# Patient Record
Sex: FEMALE | Race: WHITE | Employment: UNEMPLOYED | URBAN - NONMETROPOLITAN AREA
[De-identification: names, ages, dates, MRNs, and addresses within clinical notes are randomized per-mention and may not be internally consistent; named-entity substitution may affect disease eponyms.]

---

## 2017-01-16 PROBLEM — O09.529 ADVANCED MATERNAL AGE IN MULTIGRAVIDA: Status: ACTIVE | Noted: 2017-01-16

## 2017-12-14 ENCOUNTER — HOSPITAL ENCOUNTER (OUTPATIENT)
Dept: MRI IMAGING | Age: 37
Discharge: HOME | End: 2017-12-14
Payer: COMMERCIAL

## 2017-12-14 DIAGNOSIS — S93.402D SPRAIN OF LEFT ANKLE, UNSPECIFIED LIGAMENT, SUBSEQUENT ENCOUNTER: ICD-10-CM

## 2017-12-14 PROCEDURE — 73721 MRI JNT OF LWR EXTRE W/O DYE: CPT

## 2019-08-29 ENCOUNTER — NURSE ONLY (OUTPATIENT)
Dept: LAB | Age: 39
End: 2019-08-29

## 2019-08-29 ENCOUNTER — OFFICE VISIT (OUTPATIENT)
Dept: RHEUMATOLOGY | Age: 39
End: 2019-08-29
Payer: COMMERCIAL

## 2019-08-29 VITALS
DIASTOLIC BLOOD PRESSURE: 70 MMHG | BODY MASS INDEX: 24.77 KG/M2 | HEART RATE: 81 BPM | SYSTOLIC BLOOD PRESSURE: 110 MMHG | WEIGHT: 134.6 LBS | HEIGHT: 62 IN | OXYGEN SATURATION: 95 %

## 2019-08-29 DIAGNOSIS — Z51.81 MEDICATION MONITORING ENCOUNTER: ICD-10-CM

## 2019-08-29 DIAGNOSIS — M25.511 CHRONIC PAIN OF BOTH SHOULDERS: ICD-10-CM

## 2019-08-29 DIAGNOSIS — G89.29 CHRONIC PAIN OF BOTH SHOULDERS: ICD-10-CM

## 2019-08-29 DIAGNOSIS — M35.9 UNDIFFERENTIATED CONNECTIVE TISSUE DISEASE (HCC): Primary | ICD-10-CM

## 2019-08-29 DIAGNOSIS — M35.9 UNDIFFERENTIATED CONNECTIVE TISSUE DISEASE (HCC): ICD-10-CM

## 2019-08-29 DIAGNOSIS — M25.512 CHRONIC PAIN OF BOTH SHOULDERS: ICD-10-CM

## 2019-08-29 LAB
ALBUMIN SERPL-MCNC: 4.7 G/DL (ref 3.5–5.1)
ALP BLD-CCNC: 43 U/L (ref 38–126)
ALT SERPL-CCNC: 12 U/L (ref 11–66)
ANION GAP SERPL CALCULATED.3IONS-SCNC: 11 MEQ/L (ref 8–16)
AST SERPL-CCNC: 15 U/L (ref 5–40)
BASOPHILS # BLD: 1 %
BASOPHILS ABSOLUTE: 0.1 THOU/MM3 (ref 0–0.1)
BILIRUB SERPL-MCNC: 0.3 MG/DL (ref 0.3–1.2)
BUN BLDV-MCNC: 18 MG/DL (ref 7–22)
C-REACTIVE PROTEIN: 0.07 MG/DL (ref 0–1)
CALCIUM SERPL-MCNC: 9.6 MG/DL (ref 8.5–10.5)
CHLORIDE BLD-SCNC: 103 MEQ/L (ref 98–111)
CO2: 28 MEQ/L (ref 23–33)
CREAT SERPL-MCNC: 0.5 MG/DL (ref 0.4–1.2)
EOSINOPHIL # BLD: 1.5 %
EOSINOPHILS ABSOLUTE: 0.1 THOU/MM3 (ref 0–0.4)
ERYTHROCYTE [DISTWIDTH] IN BLOOD BY AUTOMATED COUNT: 11 % (ref 11.5–14.5)
ERYTHROCYTE [DISTWIDTH] IN BLOOD BY AUTOMATED COUNT: 41.9 FL (ref 35–45)
GFR SERPL CREATININE-BSD FRML MDRD: > 90 ML/MIN/1.73M2
GLUCOSE BLD-MCNC: 95 MG/DL (ref 70–108)
HCT VFR BLD CALC: 45.1 % (ref 37–47)
HEMOGLOBIN: 14.8 GM/DL (ref 12–16)
IMMATURE GRANS (ABS): 0.02 THOU/MM3 (ref 0–0.07)
IMMATURE GRANULOCYTES: 0 %
LYMPHOCYTES # BLD: 28.7 %
LYMPHOCYTES ABSOLUTE: 1.7 THOU/MM3 (ref 1–4.8)
MCH RBC QN AUTO: 33.6 PG (ref 26–33)
MCHC RBC AUTO-ENTMCNC: 32.8 GM/DL (ref 32.2–35.5)
MCV RBC AUTO: 102.5 FL (ref 81–99)
MONOCYTES # BLD: 6.6 %
MONOCYTES ABSOLUTE: 0.4 THOU/MM3 (ref 0.4–1.3)
NUCLEATED RED BLOOD CELLS: 0 /100 WBC
PLATELET # BLD: 170 THOU/MM3 (ref 130–400)
PMV BLD AUTO: 11.5 FL (ref 9.4–12.4)
POTASSIUM SERPL-SCNC: 4.2 MEQ/L (ref 3.5–5.2)
RBC # BLD: 4.4 MILL/MM3 (ref 4.2–5.4)
SEDIMENTATION RATE, ERYTHROCYTE: 2 MM/HR (ref 0–20)
SEG NEUTROPHILS: 61.9 %
SEGMENTED NEUTROPHILS ABSOLUTE COUNT: 3.7 THOU/MM3 (ref 1.8–7.7)
SODIUM BLD-SCNC: 142 MEQ/L (ref 135–145)
TOTAL PROTEIN: 7 G/DL (ref 6.1–8)
WBC # BLD: 5.9 THOU/MM3 (ref 4.8–10.8)

## 2019-08-29 PROCEDURE — 99244 OFF/OP CNSLTJ NEW/EST MOD 40: CPT | Performed by: INTERNAL MEDICINE

## 2019-08-29 ASSESSMENT — ENCOUNTER SYMPTOMS
DIARRHEA: 0
NAUSEA: 0
EYE PAIN: 0
EYES NEGATIVE: 1
VOMITING: 0
SHORTNESS OF BREATH: 0
EYE REDNESS: 0
COUGH: 0
WHEEZING: 0
CONSTIPATION: 0

## 2019-08-29 NOTE — PROGRESS NOTES
Gastrointestinal: Negative for constipation, diarrhea, nausea and vomiting. Genitourinary: Negative for difficulty urinating, frequency and hematuria. Musculoskeletal: Negative for myalgias. Skin: Negative for rash. Neurological: Negative for dizziness, weakness and headaches. Hematological: Does not bruise/bleed easily. Psychiatric/Behavioral: Negative for sleep disturbance. The patient is not nervous/anxious. PAST MEDICAL HISTORY      Past Medical History:   Diagnosis Date    Asthma     when in high school currently takes no meds, exercised induced    Psoriatic arthritis ([facility] [address]        PAST SURGICAL HISTORY      Past Surgical History:   Procedure Laterality Date    KNEE ARTHROSCOPY      TONSILLECTOMY      WISDOM TOOTH EXTRACTION         FAMILY HISTORY      Family History   Problem Relation Age of Onset    High Blood Pressure Mother     High Blood Pressure Father     Asthma Father        SOCIAL HISTORY      Social History     Tobacco History     Smoking Status  Never Smoker    Smokeless Tobacco Use  Unknown          Alcohol History     Alcohol Use Status  No          Drug Use     Drug Use Status  No          Sexual Activity     Sexually Active  Yes Partners  Male                ALLERGIES     Allergies   Allergen Reactions    Sulfa Antibiotics Rash       CURRENT MEDICATIONS      Current Outpatient Medications   Medication Sig Dispense Refill    hydroxychloroquine (PLAQUENIL) 200 MG tablet Take 200 mg by mouth daily.  aspirin 81 MG chewable tablet Take 81 mg by mouth daily.  prenatal vitamin (PRENATAL-S) 27-0.8 MG TABS Take 1 tablet by mouth daily.  Cholecalciferol (VITAMIN D) 2000 UNITS CAPS capsule Take 1 capsule by mouth. No current facility-administered medications for this visit.         PHYSICAL EXAMINATION / OBJECTIVE     Objective:  /70 (Site: Left Upper Arm, Position: Sitting, Cuff Size: Medium Adult)   Pulse 81   Ht 5' 2.01\" (1.575 m)   Wt 134 components found for: HGBA1C    Lab Results   Component Value Date    TSH 0.642 06/23/2016     No results found for: VITD25      No results found for: ANASCRN  No results found for: SSA  No results found for: SSB  No results found for: ANTI-SMITH  No results found for: DSDNAAB   No results found for: ANTIRNP  No results found for: C3, C4  No results found for: CCPAB  No results found for: RF    No components found for: CANCASCRN, APANCASCRN  No results found for: SEDRATE  No results found for: CRP    RADIOLOGY:     XR hands   XR SI joint May 2012  Impression: Lumbosacral spine and sacroiliac joints. The sacroiliac   joints show no evidence of a sacroiliitis. The alignment of the   lumbosacral spine is within normal limits, no spondylolisthesis, no   wedge compression fractures of the lumbar spine. Both hands: No definite erosions. Preservation of joint space. Mineralization within normal limits. No definite features to suggest an   inflammatory arthropathy. ASSESSMENT/PLAN    Assessment   Plan   1. Undifferentiated connective tissue disease (HCC)   Cont plaquenil 200mg daily    Baseline evaluation as above. - CBC Auto Differential; Future  - Comprehensive Metabolic Panel; Future  - Sedimentation Rate; Future  - C-Reactive Protein; Future  - XR SACROILIAC JOINTS (MIN 3 VIEWS); Future  - XR LUMBAR SPINE (2-3 VIEWS); Future  - Miscellaneous Sendout 1; Future- AVISE CTD panel     2. Medication monitoring encounter  - CBC Auto Differential; Future  - Comprehensive Metabolic Panel; Future  - Sedimentation Rate; Future  - C-Reactive Protein; Future  - XR SACROILIAC JOINTS (MIN 3 VIEWS); Future  - XR LUMBAR SPINE (2-3 VIEWS); Future  - Miscellaneous Sendout 1; Future    3. Chronic pain of both shoulders  Crepitus, localized tenderness lateral shoulder, + crank, hawking, scarf testing.    - ? Labral vs rotator cuff pathology   - x-ray eval.    - discussed divina cuellar.    - CBC Auto Differential; Future  -

## 2019-08-30 ENCOUNTER — TELEPHONE (OUTPATIENT)
Dept: RHEUMATOLOGY | Age: 39
End: 2019-08-30

## 2019-08-31 LAB
DRVVT 1:1 MIX: NORMAL SEC (ref 33–44)
DRVVT CONFIRMATION TEST: NORMAL RATIO
DRVVT SCREEN: 33 SEC (ref 33–44)
HEXAGONAL PHOSPHOLIPID NEUTRALIZAT TEST: NORMAL
LUPUS ANTICOAG INTERP: NORMAL
PLATELET NEUTRALIZATION: NORMAL
PROTHROMBIN TIME: 12.7 SEC (ref 12–15.5)
PTT 1:1 MIX: NORMAL SEC (ref 32–48)
PTT LUPUS ANTICOAGULANT: 37 SEC (ref 32–48)
PTT-HEPARIN NEUTRALIZED: NORMAL SEC (ref 32–48)
REPTILASE TIME: NORMAL SEC
THROMBIN TIME: NORMAL SEC (ref 14.7–19.5)

## 2019-09-06 ENCOUNTER — HOSPITAL ENCOUNTER (OUTPATIENT)
Dept: GENERAL RADIOLOGY | Age: 39
Discharge: HOME | End: 2019-09-06
Payer: COMMERCIAL

## 2019-09-06 ENCOUNTER — HOSPITAL ENCOUNTER (OUTPATIENT)
Age: 39
Discharge: HOME | End: 2019-09-06
Payer: COMMERCIAL

## 2019-09-06 DIAGNOSIS — G89.29 CHRONIC PAIN OF BOTH SHOULDERS: ICD-10-CM

## 2019-09-06 DIAGNOSIS — Z51.81 MEDICATION MONITORING ENCOUNTER: ICD-10-CM

## 2019-09-06 DIAGNOSIS — M25.512 CHRONIC PAIN OF BOTH SHOULDERS: ICD-10-CM

## 2019-09-06 DIAGNOSIS — L40.50 PSORIATIC ARTHRITIS (HCC): Primary | ICD-10-CM

## 2019-09-06 DIAGNOSIS — M35.9 UNDIFFERENTIATED CONNECTIVE TISSUE DISEASE (HCC): ICD-10-CM

## 2019-09-06 DIAGNOSIS — G89.29 CHRONIC MIDLINE LOW BACK PAIN, WITH SCIATICA PRESENCE UNSPECIFIED: ICD-10-CM

## 2019-09-06 DIAGNOSIS — M54.5 CHRONIC MIDLINE LOW BACK PAIN, WITH SCIATICA PRESENCE UNSPECIFIED: ICD-10-CM

## 2019-09-06 DIAGNOSIS — M25.511 CHRONIC PAIN OF BOTH SHOULDERS: ICD-10-CM

## 2019-09-06 PROCEDURE — 73030 X-RAY EXAM OF SHOULDER: CPT

## 2019-09-06 PROCEDURE — 72100 X-RAY EXAM L-S SPINE 2/3 VWS: CPT

## 2019-09-06 PROCEDURE — 72202 X-RAY EXAM SI JOINTS 3/> VWS: CPT

## 2019-09-09 ENCOUNTER — TELEPHONE (OUTPATIENT)
Dept: RHEUMATOLOGY | Age: 39
End: 2019-09-09

## 2019-09-16 ENCOUNTER — PATIENT MESSAGE (OUTPATIENT)
Dept: RHEUMATOLOGY | Age: 39
End: 2019-09-16

## 2019-09-17 NOTE — TELEPHONE ENCOUNTER
Rx previously prescribed by historical provider. Needs quantity and refill clarification. Rx for plaquenil pending.      DOLV: 08/29/19  DONV: 01/09/19  Last lab draw: 08/29/19

## 2019-09-22 RX ORDER — HYDROXYCHLOROQUINE SULFATE 200 MG/1
200 TABLET, FILM COATED ORAL DAILY
Qty: 30 TABLET | Refills: 2 | Status: SHIPPED | OUTPATIENT
Start: 2019-09-22 | End: 2019-12-30 | Stop reason: SDUPTHER

## 2019-09-26 ENCOUNTER — HOSPITAL ENCOUNTER (OUTPATIENT)
Dept: MRI IMAGING | Age: 39
Discharge: HOME | End: 2019-09-26
Payer: COMMERCIAL

## 2019-09-26 DIAGNOSIS — M54.5 CHRONIC MIDLINE LOW BACK PAIN, WITH SCIATICA PRESENCE UNSPECIFIED: ICD-10-CM

## 2019-09-26 DIAGNOSIS — G89.29 CHRONIC MIDLINE LOW BACK PAIN, WITH SCIATICA PRESENCE UNSPECIFIED: ICD-10-CM

## 2019-09-26 DIAGNOSIS — L40.50 PSORIATIC ARTHRITIS (HCC): ICD-10-CM

## 2019-09-26 PROCEDURE — 72197 MRI PELVIS W/O & W/DYE: CPT

## 2019-09-26 PROCEDURE — A9579 GAD-BASE MR CONTRAST NOS,1ML: HCPCS | Performed by: INTERNAL MEDICINE

## 2019-09-26 PROCEDURE — 6360000004 HC RX CONTRAST MEDICATION: Performed by: INTERNAL MEDICINE

## 2019-09-26 RX ADMIN — GADOTERIDOL 12 ML: 279.3 INJECTION, SOLUTION INTRAVENOUS at 12:25

## 2019-12-30 RX ORDER — HYDROXYCHLOROQUINE SULFATE 200 MG/1
200 TABLET, FILM COATED ORAL DAILY
Qty: 30 TABLET | Refills: 2 | Status: SHIPPED | OUTPATIENT
Start: 2019-12-30 | End: 2020-03-19 | Stop reason: SDUPTHER

## 2020-01-09 ENCOUNTER — OFFICE VISIT (OUTPATIENT)
Dept: RHEUMATOLOGY | Age: 40
End: 2020-01-09
Payer: COMMERCIAL

## 2020-01-09 VITALS
OXYGEN SATURATION: 97 % | HEART RATE: 94 BPM | DIASTOLIC BLOOD PRESSURE: 62 MMHG | SYSTOLIC BLOOD PRESSURE: 102 MMHG | HEIGHT: 62 IN | BODY MASS INDEX: 25.14 KG/M2 | WEIGHT: 136.6 LBS

## 2020-01-09 PROCEDURE — 99213 OFFICE O/P EST LOW 20 MIN: CPT | Performed by: INTERNAL MEDICINE

## 2020-01-09 ASSESSMENT — ENCOUNTER SYMPTOMS
EYE PAIN: 0
EYES NEGATIVE: 1
SHORTNESS OF BREATH: 0
WHEEZING: 0
EYE REDNESS: 0
VOMITING: 0
CONSTIPATION: 0
NAUSEA: 0
DIARRHEA: 0
COUGH: 0

## 2020-01-09 NOTE — PROGRESS NOTES
[facility]       Date Of Service: 1/9/2020  Provider: Barry Lr DO    Name: Davonte Dooley   MRN: [id number]    Chief Complaint(s)      Chief Complaint   Patient presents with    Follow-up     4 months- UCTD       History of Present illness (HPI)    Davonte Dooley   is a(n)39 y.o. female with a hx of PsA vs undiff connective tissue disease, astham, gastric ulcer previously,  Referred here for the f/u evaluation of the undiff inflammatory arthritis/ UCTD. Tolerating plaquenil with decreased pain/arthralgia     Back pain intermittent, localized, up to 2/10. Aggravating factors: inactivity, prolonged use. Alleviating factors: ibuprofen, stretching. Denies radicular symptoms. Occasional prox. arm and tinling  with waking up in the mornings that resolves up to 15 minutes. Raynaud - denies activity or paresthesia recently. Fmhx: Psoriasis -- father, pat uncle and cousing. , mother and MGM w/ arthritis of hands     Review of Systems    Review of Systems   Constitutional: Negative for diaphoresis, fatigue and fever. HENT: Negative for congestion, hearing loss and nosebleeds. Eyes: Negative. Negative for pain and redness. Respiratory: Negative for cough, shortness of breath and wheezing. Cardiovascular: Negative. Negative for chest pain. Gastrointestinal: Negative for constipation, diarrhea, nausea and vomiting. Genitourinary: Negative for difficulty urinating, frequency and hematuria. Musculoskeletal: Negative for myalgias. Skin: Negative for rash. Neurological: Negative for dizziness, weakness and headaches. Hematological: Does not bruise/bleed easily. Psychiatric/Behavioral: Negative for sleep disturbance. The patient is not nervous/anxious.               PAST MEDICAL HISTORY      Past Medical History:   Diagnosis Date    Asthma     when in high school currently takes no meds, exercised induced    Psoriatic arthritis ([facility] [address]        PAST SURGICAL HISTORY      Past Surgical History:   Procedure Laterality Date    KNEE ARTHROSCOPY      TONSILLECTOMY      WISDOM TOOTH EXTRACTION         FAMILY HISTORY      Family History   Problem Relation Age of Onset    High Blood Pressure Mother     High Blood Pressure Father     Asthma Father     Arthritis Father     Arthritis Paternal Aunt         psa    Psoriasis Paternal Uncle     Asthma Child     Migraines Child        SOCIAL HISTORY      Social History     Tobacco History     Smoking Status  Never Smoker    Smokeless Tobacco Use  Unknown          Alcohol History     Alcohol Use Status  No          Drug Use     Drug Use Status  No          Sexual Activity     Sexually Active  Yes Partners  Male                ALLERGIES     Allergies   Allergen Reactions    Sulfa Antibiotics Rash       CURRENT MEDICATIONS      Current Outpatient Medications   Medication Sig Dispense Refill    hydroxychloroquine (PLAQUENIL) 200 MG tablet Take 1 tablet by mouth daily 30 tablet 2    Multiple Vitamins-Minerals (MULTIVITAMIN ADULT PO) Take by mouth      Acetaminophen (TYLENOL ARTHRITIS PAIN PO) Take by mouth      Cholecalciferol (VITAMIN D) 2000 UNITS CAPS capsule Take 1 capsule by mouth. No current facility-administered medications for this visit. PHYSICAL EXAMINATION / OBJECTIVE     Objective:  /62 (Site: Left Upper Arm, Position: Sitting, Cuff Size: Medium Adult)   Pulse 94   Ht 5' 2.01\" (1.575 m)   Wt 136 lb 9.6 oz (62 kg)   SpO2 97%   BMI 24.98 kg/m²     Physical Exam  Constitutional:       General: She is not in acute distress. Appearance: She is well-developed. She is not diaphoretic. HENT:      Head: Normocephalic and atraumatic. Eyes:      General: No scleral icterus. Conjunctiva/sclera: Conjunctivae normal.   Neck:      Musculoskeletal: Normal range of motion and neck supple. Cardiovascular:      Rate and Rhythm: Normal rate and regular rhythm.       Heart sounds: Normal heart sounds. Pulmonary:      Effort: Pulmonary effort is normal. No respiratory distress. Breath sounds: Normal breath sounds. No wheezing or rales. Lymphadenopathy:      Cervical: No cervical adenopathy. Skin:     General: Skin is warm and dry. Neurological:      Mental Status: She is alert and oriented to person, place, and time. Psychiatric:         Behavior: Behavior normal.             Musculoskeletal:     Normal gait. Strength 5/5 in biceps, triceps, hips, knees,      Upper extremities:   Shoulders:  No swelling , No Deformities and intact ROM  Elbows:  . Wrists ,Hands: Non-tender, No swelling , No Deformities and intact ROM    Lower extremities:  Hips: Non-tender, No swelling , No Deformities and intact ROM  Knees :Non-tender, No swelling , No Deformities and intact ROM  Ankles: Non-tender, No swelling , No Deformities and intact ROM  Feet: non-tender, no swelling    Spine:     C-spine: intact ROM, Non-tender, no swelling.    Tender - LS spine midline, negative SLR test, intact ROM,          LABS        CBC  Lab Results   Component Value Date    WBC 5.9 08/29/2019    RBC 4.40 08/29/2019    RBC 4.29 06/23/2016    HGB 14.8 08/29/2019    HCT 45.1 08/29/2019    .5 08/29/2019    MCH 33.6 08/29/2019    MCHC 32.8 08/29/2019    RDW 12.7 01/16/2017     08/29/2019       CMP  Lab Results   Component Value Date    CALCIUM 9.6 08/29/2019    LABALBU 4.7 08/29/2019    PROT 7.0 08/29/2019     08/29/2019    K 4.2 08/29/2019    CO2 28 08/29/2019     08/29/2019    BUN 18 08/29/2019    CREATININE 0.5 08/29/2019    ALKPHOS 43 08/29/2019    ALT 12 08/29/2019    AST 15 08/29/2019       HgBA1c: No components found for: HGBA1C    Lab Results   Component Value Date    TSH 0.642 06/23/2016     No results found for: VITD25      No results found for: ANASCRN  No results found for: SSA  No results found for: SSB  No results found for: ANTI-SMITH  No results found for: DSDNAAB   No results found

## 2020-03-19 RX ORDER — HYDROXYCHLOROQUINE SULFATE 200 MG/1
200 TABLET, FILM COATED ORAL DAILY
Qty: 90 TABLET | Refills: 3 | Status: SHIPPED | OUTPATIENT
Start: 2020-03-19 | End: 2020-12-07

## 2020-03-19 NOTE — TELEPHONE ENCOUNTER
Maged Bass called requesting a refill on the following medications:  Requested Prescriptions     Pending Prescriptions Disp Refills    hydroxychloroquine (PLAQUENIL) 200 MG tablet 30 tablet 2     Sig: Take 1 tablet by mouth daily     Pharmacy verified:  [facility]    They are requesting 90 day supply with 3 refills       Date of last visit: 1/9/2020  Date of next visit (if applicable): Visit date not found

## 2020-05-29 ENCOUNTER — TELEPHONE (OUTPATIENT)
Dept: PHYSICAL MEDICINE AND REHAB | Age: 40
End: 2020-05-29

## 2020-05-29 RX ORDER — METHYLPREDNISOLONE 4 MG/1
TABLET ORAL
Qty: 1 KIT | Refills: 0 | Status: SHIPPED | OUTPATIENT
Start: 2020-05-29 | End: 2020-06-04

## 2020-05-29 NOTE — TELEPHONE ENCOUNTER
Patient called stating pain overall in joints, worse in hands. Pain started last weekend. 6/10 aching pain, radiating when moving. Using Tylenol and ibuprofen no relief. No ice or heat. Swelling in ankles, and hands. Denies redness. Wanting to know if she can have a medrol pack or steroid to help. Call meds in to [facility]. Please advise.

## 2020-06-04 ENCOUNTER — TELEMEDICINE (OUTPATIENT)
Dept: RHEUMATOLOGY | Age: 40
End: 2020-06-04
Payer: COMMERCIAL

## 2020-06-04 PROCEDURE — 99442 PR PHYS/QHP TELEPHONE EVALUATION 11-20 MIN: CPT | Performed by: NURSE PRACTITIONER

## 2020-06-04 NOTE — PROGRESS NOTES
Courtney Nash is a 47 y.o. female evaluated via telephone/virtual visit on 6/4/2020. Consent:  She and/or health care decision maker is aware that that she may receive a bill for this telephone/virtual service, depending on her insurance coverage, and has provided verbal consent to proceed: Yes    Communication completed via : Telephone --- PER PATIENT REQUEST    I affirm this is a Patient Initiated Episode with an Established Patient who has not had a related appointment within my department in the past 7 days or scheduled within the next 24 hours. Documentation:  I communicated with the patient and/or health care decision maker the medical information as listed below. Details of this discussion including any medical advice provided -- SEE Assessment and Plan.        Total Time: 11-20 minutes    Note: not billable if this call serves to triage the patient into an appointment for the relevant concern      [address] UP NOTE       Date Of Service: 6/4/2020  Provider: Kamila Velarde CNP    Name: Courtney Nash   MRN: [id number]    Chief Complaint(s)     Chief Complaint   Patient presents with    Follow-up     5 month follow up        History of Present Illness (HPI)     Courtney Nash  is a(n)40 y.o. female with a hx of PsA vs undifferentiated connective tissue disease, asthma, gastric ulcer previosuly for the f/u evaluation of UCTD     Interval hx:    - flare up starting last weekend- was working in garden for 3 days straight- resolved with medrol dosepack- this has been first flare up in years    pain affecting the bilateral hands  Pain on a scale 0-10: 2/10  Type of pain: intermittent, stiffness  Timing:morning  Aggravating factors: increased use  Alleviating factors: prednisone    Associated symptoms:  + swelling/  Redness/ warmth (bilateral ankles), + AM stiffness lasting ~ 30 minutes        ASSESSMENT/PLAN    Assessment   Plan Undifferentiated connective tissue disease  - +TIMUR, neg subserologies   - continue plaquenil 200 mg daily   - patient wanting to hold off on medication changes at this time due to this being only flare up in years. Chronic pain of both shoulders  - crepitus, localized tenderness lateral shoulder  - xray eval w/ no abnormalities (9/2019)    Chronic low back pain  - intermittent, localized, + shober testing. MRI w/o abnormalities. - xray more consistent with osteitis condensans    Medication monitoring   - labs Aug 2020   - plaquenil eye exam (Dr. Merari Pan in [place])- request records      Return in about 6 months (around 12/4/2020). Electronically signed by SHOAIB Arana CNP on 6/4/2020 at 2:05 PM    New Prescriptions    No medications on file       Thank you for allowing me to participate in the care of this patient. Please call if there are any questions.

## 2020-12-03 ENCOUNTER — NURSE ONLY (OUTPATIENT)
Dept: LAB | Age: 40
End: 2020-12-03

## 2020-12-03 ENCOUNTER — OFFICE VISIT (OUTPATIENT)
Dept: RHEUMATOLOGY | Age: 40
End: 2020-12-03
Payer: COMMERCIAL

## 2020-12-03 VITALS
OXYGEN SATURATION: 100 % | TEMPERATURE: 97.3 F | WEIGHT: 144 LBS | HEIGHT: 62 IN | BODY MASS INDEX: 26.5 KG/M2 | SYSTOLIC BLOOD PRESSURE: 104 MMHG | HEART RATE: 97 BPM | DIASTOLIC BLOOD PRESSURE: 62 MMHG

## 2020-12-03 LAB
ALBUMIN SERPL-MCNC: 4.5 G/DL (ref 3.5–5.1)
ALP BLD-CCNC: 41 U/L (ref 38–126)
ALT SERPL-CCNC: 17 U/L (ref 11–66)
ANION GAP SERPL CALCULATED.3IONS-SCNC: 15 MEQ/L (ref 8–16)
AST SERPL-CCNC: 18 U/L (ref 5–40)
BASOPHILS # BLD: 0.9 %
BASOPHILS ABSOLUTE: 0 THOU/MM3 (ref 0–0.1)
BILIRUB SERPL-MCNC: 0.3 MG/DL (ref 0.3–1.2)
BUN BLDV-MCNC: 17 MG/DL (ref 7–22)
C-REACTIVE PROTEIN: 0.04 MG/DL (ref 0–1)
CALCIUM SERPL-MCNC: 9.4 MG/DL (ref 8.5–10.5)
CHLORIDE BLD-SCNC: 102 MEQ/L (ref 98–111)
CO2: 26 MEQ/L (ref 23–33)
CREAT SERPL-MCNC: 0.6 MG/DL (ref 0.4–1.2)
EOSINOPHIL # BLD: 2.2 %
EOSINOPHILS ABSOLUTE: 0.1 THOU/MM3 (ref 0–0.4)
ERYTHROCYTE [DISTWIDTH] IN BLOOD BY AUTOMATED COUNT: 11.3 % (ref 11.5–14.5)
ERYTHROCYTE [DISTWIDTH] IN BLOOD BY AUTOMATED COUNT: 43.8 FL (ref 35–45)
GFR SERPL CREATININE-BSD FRML MDRD: > 90 ML/MIN/1.73M2
GLUCOSE BLD-MCNC: 86 MG/DL (ref 70–108)
HCT VFR BLD CALC: 45.8 % (ref 37–47)
HEMOGLOBIN: 14.7 GM/DL (ref 12–16)
IMMATURE GRANS (ABS): 0.01 THOU/MM3 (ref 0–0.07)
IMMATURE GRANULOCYTES: 0.2 %
LYMPHOCYTES # BLD: 28.8 %
LYMPHOCYTES ABSOLUTE: 1.6 THOU/MM3 (ref 1–4.8)
MCH RBC QN AUTO: 33.8 PG (ref 26–33)
MCHC RBC AUTO-ENTMCNC: 32.1 GM/DL (ref 32.2–35.5)
MCV RBC AUTO: 105.3 FL (ref 81–99)
MONOCYTES # BLD: 6.2 %
MONOCYTES ABSOLUTE: 0.3 THOU/MM3 (ref 0.4–1.3)
NUCLEATED RED BLOOD CELLS: 0 /100 WBC
PLATELET # BLD: 181 THOU/MM3 (ref 130–400)
PMV BLD AUTO: 12.7 FL (ref 9.4–12.4)
POTASSIUM SERPL-SCNC: 4 MEQ/L (ref 3.5–5.2)
RBC # BLD: 4.35 MILL/MM3 (ref 4.2–5.4)
SEDIMENTATION RATE, ERYTHROCYTE: 2 MM/HR (ref 0–20)
SEG NEUTROPHILS: 61.7 %
SEGMENTED NEUTROPHILS ABSOLUTE COUNT: 3.4 THOU/MM3 (ref 1.8–7.7)
SODIUM BLD-SCNC: 143 MEQ/L (ref 135–145)
TOTAL PROTEIN: 6.7 G/DL (ref 6.1–8)
WBC # BLD: 5.5 THOU/MM3 (ref 4.8–10.8)

## 2020-12-03 PROCEDURE — 99214 OFFICE O/P EST MOD 30 MIN: CPT | Performed by: INTERNAL MEDICINE

## 2020-12-03 ASSESSMENT — ENCOUNTER SYMPTOMS
EYES NEGATIVE: 1
WHEEZING: 0
VOMITING: 0
NAUSEA: 0
CONSTIPATION: 0
EYE PAIN: 0
SHORTNESS OF BREATH: 0
COUGH: 0
EYE REDNESS: 0
DIARRHEA: 0

## 2020-12-03 NOTE — PROGRESS NOTES
[facility] RHEUMATOLOGY FOLLOW UP NOTE       Date Of Service: 12/3/2020  Provider: Moraima Sabillon DO  PCP: Jackson Colbert MD   Name: Cheryle Boas   MRN: [id number]        History of Present Illness (HPI)     Chief Complaint   Patient presents with    6 Month Follow-Up     C/ Esther 68  is a(n)40 y.o. female with a hx of PsA vs undifferentiated connective tissue disease, asthma, gastric ulcer previosuly for the f/u evaluation of undiff. inflammatory arthritis, medication monitoring    No significant flare Stable disease, no significant flares since the last evaluation. Mild intermittent bilat foot and lower back pain , typically in the morning and evenings. Denies joint pain joint swelling, redness,warmth. Reported taking plaquenil. Denies radicular pain, paresthesia. fmhx - father - psoriasis, pat uncle and pat cousing w/ psoriatic arthritis. REVIEW OF SYSTEMS: (ROS)    Review of Systems   Constitutional: Negative for diaphoresis, fatigue and fever. HENT: Negative for congestion, hearing loss and nosebleeds. Eyes: Negative. Negative for pain and redness. Respiratory: Negative for cough, shortness of breath and wheezing. Cardiovascular: Negative. Negative for chest pain. Gastrointestinal: Negative for constipation, diarrhea, nausea and vomiting. Genitourinary: Negative for difficulty urinating, frequency and hematuria. Musculoskeletal: Negative for myalgias. Skin: Negative for rash. Neurological: Negative for dizziness, weakness and headaches. Hematological: Does not bruise/bleed easily. Psychiatric/Behavioral: Negative for sleep disturbance. The patient is not nervous/anxious. PAST MEDICAL HISTORY     has a past medical history of Asthma and Psoriatic arthritis ([facility] [address] PAST SURGICAL HISTORY     has a past surgical history that includes [place] tooth extraction; Tonsillectomy; and Knee arthroscopy. Kavon Krueger     FAMILY HISTORY    Reviewed and updated in chart. SOCIAL HISTORY     reports that she has never smoked. She has never used smokeless tobacco. She reports that she does not drink alcohol or use drugs. ALLERGIES     Allergies   Allergen Reactions    Sulfa Antibiotics Rash       CURRENT MEDICATIONS      Current Outpatient Medications:     hydroxychloroquine (PLAQUENIL) 200 MG tablet, Take 1 tablet by mouth daily, Disp: 90 tablet, Rfl: 3    Multiple Vitamins-Minerals (MULTIVITAMIN ADULT PO), Take by mouth, Disp: , Rfl:     Acetaminophen (TYLENOL ARTHRITIS PAIN PO), Take by mouth, Disp: , Rfl:     Cholecalciferol (VITAMIN D) 2000 UNITS CAPS capsule, Take 1 capsule by mouth., Disp: , Rfl:     PHYSICAL EXAMINATION / OBJECTIVE     Objective:  /62 (Site: Left Upper Arm, Position: Sitting, Cuff Size: Medium Adult)   Pulse 97   Temp 97.3 °F (36.3 °C)   Ht 5' 2.01\" (1.575 m)   Wt 144 lb (65.3 kg)   SpO2 100%   BMI 26.33 kg/m²     General Appearance: General appearance:  AAO x 3 ,  well-developed and well nourished  Head: NCAT  Eyes: No abnormalities. ,  Sclera non-icteric,   Ears / Nose:  normal  appearance  ears and nose. No active drainage from nose. Mouth:  MMM, ears w/o deformities  Neck: No jugular venous distention, appears symmetric, good ROM  Lymph: no cervical adenopathy   Pulmonary/Chest: CTA bilateral ,  symmetric chest expansion. Cardiovascular: Normal S1 and S2, NO murmur, rub, gallop  : Deferred   Abd/GI: Deferred   Neurologic: Speech normal, no facial droop,  Skin: NO rash on exposed skin. Musculoskeletal:  Upper extremities:  SHOULDERS ,  ELBOWS ,  WRISTS ,  HANDS/FINGERS - non-tender, no swelling      Lower extremities:  HIPS  KNEES  ANKLES , non-tender, no swelling     FEET : MTP compression bilat. Exam KEY:   Tender :  T    Swelling: S,   Deformities: D,   Non-tender : NT  ,  No swelling: NS       RAPID 3:   12/3/2020 --- RAPID 3: 0 + 0 + 0 = 0       Remission: <3  Low Disease Activity: <6  Moderate Disease Activity: >=6 and <=12  High Disease Activity: >12     LABS      Lab Results   Component Value Date    WBC 5.9 08/29/2019    HGB 14.8 08/29/2019    .5 (H) 08/29/2019    MCHC 32.8 08/29/2019    RDW 12.7 01/16/2017     08/29/2019    NEUTROABS 5.1 06/23/2016    LYMPHSABS 1.7 08/29/2019    EOSABS 0.1 08/29/2019    BASOSABS 0.1 08/29/2019         Chemistry        Component Value Date/Time     08/29/2019 1231    K 4.2 08/29/2019 1231     08/29/2019 1231    CO2 28 08/29/2019 1231    BUN 18 08/29/2019 1231    CREATININE 0.5 08/29/2019 1231        Component Value Date/Time    CALCIUM 9.6 08/29/2019 1231    ALKPHOS 43 08/29/2019 1231    AST 15 08/29/2019 1231    ALT 12 08/29/2019 1231    BILITOT 0.3 08/29/2019 1231            Lab Results   Component Value Date    SEDRATE 2 08/29/2019    CRP 0.07 08/29/2019       No results found for: VITD25    No results found for: ANASCRN  No results found for: SSA  No results found for: SSB  No results found for: ANTI-SMITH  No results found for: DSDNAAB   No results found for: ANTIRNP  No results found for: C3, C4  No results found for: CCPAB  No results found for: RF        RADIOLOGY / PROCEDURES:     ASSESSMENT/PLAN:     Undifferentiated connective tissue disease - +TIMUR, neg subserologies, strong fmhx of psoriasis & PsA   - continue plaquenil 200 mg daily --- declined additional medications. Chronic pain of both shoulders -- resolved. Chronic low back pain --   - intermittent, localized, + shober testing. MRI w/o abnormalities.    - xray more consistent with osteitis condensans    Medication monitoring   - labs Aug 2020   - plaquenil eye exam (Dr. Claudio Mcclure in [place])- request records

## 2020-12-07 RX ORDER — HYDROXYCHLOROQUINE SULFATE 200 MG/1
TABLET, FILM COATED ORAL
Qty: 90 TABLET | Refills: 0 | Status: SHIPPED | OUTPATIENT
Start: 2020-12-07 | End: 2021-06-07

## 2021-06-04 DIAGNOSIS — M35.9 UNDIFFERENTIATED CONNECTIVE TISSUE DISEASE (HCC): ICD-10-CM

## 2021-06-07 RX ORDER — HYDROXYCHLOROQUINE SULFATE 200 MG/1
TABLET, FILM COATED ORAL
Qty: 90 TABLET | Refills: 0 | Status: SHIPPED | OUTPATIENT
Start: 2021-06-07 | End: 2021-09-08

## 2021-06-07 NOTE — TELEPHONE ENCOUNTER
Please call and ask the patients' eye care provider for the results of the OCT (occulocoherence tomography) testing.

## 2021-08-27 ENCOUNTER — NURSE ONLY (OUTPATIENT)
Dept: LAB | Age: 41
End: 2021-08-27

## 2021-09-08 DIAGNOSIS — M35.9 UNDIFFERENTIATED CONNECTIVE TISSUE DISEASE (HCC): ICD-10-CM

## 2021-09-08 RX ORDER — HYDROXYCHLOROQUINE SULFATE 200 MG/1
TABLET, FILM COATED ORAL
Qty: 90 TABLET | Refills: 0 | Status: SHIPPED | OUTPATIENT
Start: 2021-09-08 | End: 2021-11-08

## 2021-10-15 NOTE — PROGRESS NOTES
NPO after midnight except for sip of water with heart/BP meds  Follow instructions given by surgeon including medications to hold   Bring insurance card and photo ID  Shower morning of surgery with liquid antibacterial soap  Wear loose comfortable clothing  Remove jewelry and do not bring valuables  Bring list of medications with dosages and how often taken if not reviewed with PAT    needed at discharge at ase 27years old  Call PAT at [phone] for questions

## 2021-10-19 ENCOUNTER — HOSPITAL ENCOUNTER (OUTPATIENT)
Age: 41
Setting detail: SPECIMEN
Discharge: HOME | End: 2021-10-19
Payer: COMMERCIAL

## 2021-10-19 ENCOUNTER — NURSE ONLY (OUTPATIENT)
Dept: FAMILY MEDICINE CLINIC | Age: 41
End: 2021-10-19
Payer: COMMERCIAL

## 2021-10-19 DIAGNOSIS — O09.529 ANTEPARTUM MULTIGRAVIDA OF ADVANCED MATERNAL AGE: ICD-10-CM

## 2021-10-19 PROCEDURE — 99999 PR OFFICE/OUTPT VISIT,PROCEDURE ONLY: CPT | Performed by: NURSE PRACTITIONER

## 2021-10-19 PROCEDURE — 36415 COLL VENOUS BLD VENIPUNCTURE: CPT | Performed by: NURSE PRACTITIONER

## 2021-10-19 NOTE — PROGRESS NOTES
Venipuncture obtained from  right arm. Patient tolerated the procedure without complications or complaints.     1 lvv

## 2021-10-20 LAB
ABSOLUTE EOS #: 0.1 K/UL (ref 0–0.44)
ABSOLUTE IMMATURE GRANULOCYTE: <0.03 K/UL (ref 0–0.3)
ABSOLUTE LYMPH #: 1.06 K/UL (ref 1.1–3.7)
ABSOLUTE MONO #: 0.39 K/UL (ref 0.1–1.2)
BASOPHILS # BLD: 1 % (ref 0–2)
BASOPHILS ABSOLUTE: 0.04 K/UL (ref 0–0.2)
DIFFERENTIAL TYPE: ABNORMAL
EOSINOPHILS RELATIVE PERCENT: 2 % (ref 1–4)
HCT VFR BLD CALC: 45.5 % (ref 36.3–47.1)
HEMOGLOBIN: 14.7 G/DL (ref 11.9–15.1)
IMMATURE GRANULOCYTES: 0 %
LYMPHOCYTES # BLD: 18 % (ref 24–43)
MCH RBC QN AUTO: 33.9 PG (ref 25.2–33.5)
MCHC RBC AUTO-ENTMCNC: 32.3 G/DL (ref 28.4–34.8)
MCV RBC AUTO: 104.8 FL (ref 82.6–102.9)
MONOCYTES # BLD: 7 % (ref 3–12)
NRBC AUTOMATED: 0 PER 100 WBC
PDW BLD-RTO: 11.4 % (ref 11.8–14.4)
PLATELET # BLD: ABNORMAL K/UL (ref 138–453)
PLATELET ESTIMATE: ABNORMAL
PLATELET, FLUORESCENCE: 153 K/UL (ref 138–453)
PLATELET, IMMATURE FRACTION: 1.7 % (ref 1.1–10.3)
PMV BLD AUTO: ABNORMAL FL (ref 8.1–13.5)
RBC # BLD: 4.34 M/UL (ref 3.95–5.11)
RBC # BLD: ABNORMAL 10*6/UL
SEG NEUTROPHILS: 72 % (ref 36–65)
SEGMENTED NEUTROPHILS ABSOLUTE COUNT: 4.18 K/UL (ref 1.5–8.1)
WBC # BLD: 5.8 K/UL (ref 3.5–11.3)
WBC # BLD: ABNORMAL 10*3/UL

## 2021-10-21 ENCOUNTER — ANESTHESIA (OUTPATIENT)
Dept: OPERATING ROOM | Age: 41
End: 2021-10-21
Payer: COMMERCIAL

## 2021-10-21 ENCOUNTER — HOSPITAL ENCOUNTER (OUTPATIENT)
Age: 41
Setting detail: OUTPATIENT SURGERY
Discharge: HOME | End: 2021-10-21
Attending: OBSTETRICS & GYNECOLOGY | Admitting: OBSTETRICS & GYNECOLOGY
Payer: COMMERCIAL

## 2021-10-21 ENCOUNTER — ANESTHESIA EVENT (OUTPATIENT)
Dept: OPERATING ROOM | Age: 41
End: 2021-10-21
Payer: COMMERCIAL

## 2021-10-21 VITALS
DIASTOLIC BLOOD PRESSURE: 81 MMHG | HEART RATE: 85 BPM | RESPIRATION RATE: 17 BRPM | OXYGEN SATURATION: 99 % | TEMPERATURE: 96.8 F | WEIGHT: 138.8 LBS | BODY MASS INDEX: 25.54 KG/M2 | HEIGHT: 62 IN | SYSTOLIC BLOOD PRESSURE: 103 MMHG

## 2021-10-21 VITALS
RESPIRATION RATE: 13 BRPM | DIASTOLIC BLOOD PRESSURE: 57 MMHG | OXYGEN SATURATION: 100 % | SYSTOLIC BLOOD PRESSURE: 99 MMHG

## 2021-10-21 DIAGNOSIS — N92.4 EXCESSIVE BLEEDING IN PREMENOPAUSAL PERIOD: Primary | ICD-10-CM

## 2021-10-21 PROBLEM — N92.0 MENORRHAGIA: Status: ACTIVE | Noted: 2021-10-21

## 2021-10-21 LAB — PREGNANCY, URINE: NEGATIVE

## 2021-10-21 PROCEDURE — 81025 URINE PREGNANCY TEST: CPT

## 2021-10-21 PROCEDURE — 3600000004 HC SURGERY LEVEL 4 BASE: Performed by: OBSTETRICS & GYNECOLOGY

## 2021-10-21 PROCEDURE — 3600000014 HC SURGERY LEVEL 4 ADDTL 15MIN: Performed by: OBSTETRICS & GYNECOLOGY

## 2021-10-21 PROCEDURE — 7100000011 HC PHASE II RECOVERY - ADDTL 15 MIN: Performed by: OBSTETRICS & GYNECOLOGY

## 2021-10-21 PROCEDURE — 2580000003 HC RX 258: Performed by: ANESTHESIOLOGY

## 2021-10-21 PROCEDURE — 2709999900 HC NON-CHARGEABLE SUPPLY: Performed by: OBSTETRICS & GYNECOLOGY

## 2021-10-21 PROCEDURE — 3700000001 HC ADD 15 MINUTES (ANESTHESIA): Performed by: OBSTETRICS & GYNECOLOGY

## 2021-10-21 PROCEDURE — 6360000002 HC RX W HCPCS

## 2021-10-21 PROCEDURE — 7100000001 HC PACU RECOVERY - ADDTL 15 MIN: Performed by: OBSTETRICS & GYNECOLOGY

## 2021-10-21 PROCEDURE — 6360000002 HC RX W HCPCS: Performed by: ANESTHESIOLOGY

## 2021-10-21 PROCEDURE — C1758 CATHETER, URETERAL: HCPCS | Performed by: OBSTETRICS & GYNECOLOGY

## 2021-10-21 PROCEDURE — 88305 TISSUE EXAM BY PATHOLOGIST: CPT

## 2021-10-21 PROCEDURE — 7100000010 HC PHASE II RECOVERY - FIRST 15 MIN: Performed by: OBSTETRICS & GYNECOLOGY

## 2021-10-21 PROCEDURE — 6370000000 HC RX 637 (ALT 250 FOR IP): Performed by: OBSTETRICS & GYNECOLOGY

## 2021-10-21 PROCEDURE — 3700000000 HC ANESTHESIA ATTENDED CARE: Performed by: OBSTETRICS & GYNECOLOGY

## 2021-10-21 PROCEDURE — 7100000000 HC PACU RECOVERY - FIRST 15 MIN: Performed by: OBSTETRICS & GYNECOLOGY

## 2021-10-21 RX ORDER — ONDANSETRON 2 MG/ML
INJECTION INTRAMUSCULAR; INTRAVENOUS
Status: COMPLETED
Start: 2021-10-21 | End: 2021-10-21

## 2021-10-21 RX ORDER — SODIUM CHLORIDE 9 MG/ML
25 INJECTION, SOLUTION INTRAVENOUS PRN
Status: DISCONTINUED | OUTPATIENT
Start: 2021-10-21 | End: 2021-10-21 | Stop reason: HOSPADM

## 2021-10-21 RX ORDER — PROPOFOL 10 MG/ML
INJECTION, EMULSION INTRAVENOUS PRN
Status: DISCONTINUED | OUTPATIENT
Start: 2021-10-21 | End: 2021-10-21 | Stop reason: SDUPTHER

## 2021-10-21 RX ORDER — MIDAZOLAM HYDROCHLORIDE 1 MG/ML
INJECTION INTRAMUSCULAR; INTRAVENOUS PRN
Status: DISCONTINUED | OUTPATIENT
Start: 2021-10-21 | End: 2021-10-21 | Stop reason: SDUPTHER

## 2021-10-21 RX ORDER — KETOROLAC TROMETHAMINE 30 MG/ML
30 INJECTION, SOLUTION INTRAMUSCULAR; INTRAVENOUS ONCE
Status: DISCONTINUED | OUTPATIENT
Start: 2021-10-21 | End: 2021-10-21 | Stop reason: HOSPADM

## 2021-10-21 RX ORDER — ONDANSETRON 2 MG/ML
4 INJECTION INTRAMUSCULAR; INTRAVENOUS ONCE
Status: COMPLETED | OUTPATIENT
Start: 2021-10-21 | End: 2021-10-21

## 2021-10-21 RX ORDER — IBUPROFEN 800 MG/1
800 TABLET ORAL EVERY 8 HOURS PRN
Status: DISCONTINUED | OUTPATIENT
Start: 2021-10-21 | End: 2021-10-21 | Stop reason: HOSPADM

## 2021-10-21 RX ORDER — HYDROCODONE BITARTRATE AND ACETAMINOPHEN 5; 325 MG/1; MG/1
1 TABLET ORAL EVERY 4 HOURS PRN
Status: DISCONTINUED | OUTPATIENT
Start: 2021-10-21 | End: 2021-10-21 | Stop reason: HOSPADM

## 2021-10-21 RX ORDER — SODIUM CHLORIDE 0.9 % (FLUSH) 0.9 %
10 SYRINGE (ML) INJECTION PRN
Status: DISCONTINUED | OUTPATIENT
Start: 2021-10-21 | End: 2021-10-21 | Stop reason: HOSPADM

## 2021-10-21 RX ORDER — SODIUM CHLORIDE 9 MG/ML
INJECTION, SOLUTION INTRAVENOUS CONTINUOUS PRN
Status: DISCONTINUED | OUTPATIENT
Start: 2021-10-21 | End: 2021-10-21 | Stop reason: SDUPTHER

## 2021-10-21 RX ORDER — KETOROLAC TROMETHAMINE 30 MG/ML
INJECTION, SOLUTION INTRAMUSCULAR; INTRAVENOUS
Status: COMPLETED
Start: 2021-10-21 | End: 2021-10-21

## 2021-10-21 RX ORDER — SODIUM CHLORIDE 0.9 % (FLUSH) 0.9 %
10 SYRINGE (ML) INJECTION EVERY 12 HOURS SCHEDULED
Status: DISCONTINUED | OUTPATIENT
Start: 2021-10-21 | End: 2021-10-21 | Stop reason: HOSPADM

## 2021-10-21 RX ORDER — FENTANYL CITRATE 50 UG/ML
INJECTION, SOLUTION INTRAMUSCULAR; INTRAVENOUS PRN
Status: DISCONTINUED | OUTPATIENT
Start: 2021-10-21 | End: 2021-10-21 | Stop reason: SDUPTHER

## 2021-10-21 RX ORDER — HYDROCODONE BITARTRATE AND ACETAMINOPHEN 5; 325 MG/1; MG/1
1 TABLET ORAL EVERY 6 HOURS PRN
Qty: 10 TABLET | Refills: 0 | Status: SHIPPED | OUTPATIENT
Start: 2021-10-21 | End: 2021-10-24

## 2021-10-21 RX ORDER — HYDROCODONE BITARTRATE AND ACETAMINOPHEN 5; 325 MG/1; MG/1
2 TABLET ORAL EVERY 4 HOURS PRN
Status: DISCONTINUED | OUTPATIENT
Start: 2021-10-21 | End: 2021-10-21 | Stop reason: HOSPADM

## 2021-10-21 RX ADMIN — ONDANSETRON 4 MG: 2 INJECTION INTRAMUSCULAR; INTRAVENOUS at 09:09

## 2021-10-21 RX ADMIN — SODIUM CHLORIDE: 9 INJECTION, SOLUTION INTRAVENOUS at 08:07

## 2021-10-21 RX ADMIN — FENTANYL CITRATE 50 MCG: 50 INJECTION, SOLUTION INTRAMUSCULAR; INTRAVENOUS at 08:34

## 2021-10-21 RX ADMIN — MIDAZOLAM 2 MG: 1 INJECTION INTRAMUSCULAR; INTRAVENOUS at 08:09

## 2021-10-21 RX ADMIN — PROPOFOL 150 MG: 10 INJECTION, EMULSION INTRAVENOUS at 08:10

## 2021-10-21 RX ADMIN — KETOROLAC TROMETHAMINE 30 MG: 30 INJECTION, SOLUTION INTRAMUSCULAR; INTRAVENOUS at 08:00

## 2021-10-21 RX ADMIN — FENTANYL CITRATE 50 MCG: 50 INJECTION, SOLUTION INTRAMUSCULAR; INTRAVENOUS at 08:10

## 2021-10-21 RX ADMIN — HYDROCODONE BITARTRATE AND ACETAMINOPHEN 1 TABLET: 5; 325 TABLET ORAL at 10:03

## 2021-10-21 ASSESSMENT — PULMONARY FUNCTION TESTS
PIF_VALUE: 0
PIF_VALUE: 4
PIF_VALUE: 13
PIF_VALUE: 8
PIF_VALUE: 11
PIF_VALUE: 3
PIF_VALUE: 1
PIF_VALUE: 11
PIF_VALUE: 2
PIF_VALUE: 2
PIF_VALUE: 13
PIF_VALUE: 1
PIF_VALUE: 15
PIF_VALUE: 14
PIF_VALUE: 13
PIF_VALUE: 0
PIF_VALUE: 1
PIF_VALUE: 11
PIF_VALUE: 12
PIF_VALUE: 9
PIF_VALUE: 11
PIF_VALUE: 2
PIF_VALUE: 0
PIF_VALUE: 10
PIF_VALUE: 3
PIF_VALUE: 0

## 2021-10-21 ASSESSMENT — PAIN SCALES - GENERAL
PAINLEVEL_OUTOF10: 0
PAINLEVEL_OUTOF10: 6

## 2021-10-21 ASSESSMENT — PAIN - FUNCTIONAL ASSESSMENT: PAIN_FUNCTIONAL_ASSESSMENT: 0-10

## 2021-10-21 NOTE — PROGRESS NOTES
7579- PT arrived to PACU phase 1. Dr Meri Cazares at [facility] at bedside for report. 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 8745- PT meets criteria she is awake and eager to go home, VSS, pt breathing deeply on room air.  moved to phase 2 early discharge ok per Dr Meri Cazares. 0723- Pt to phase 2,  at bedside, pt assisted to move up in bed and rest  0858- Water given. 9861- Called back to OR Dr Mirela Hayes ok with 4 mg of zofran.  0909- PT given 4 mg of zofran IV for upset stomach. 7337- Pt sleeping now  wanted me to let her rest.  0946- IV capped off pt assisted to bathroom. 0950- Pt shivering back to room. 7541- Pt still little nauseous updated eat some applesauce before pain pill don't want her to get sick. 1030- Pt pain better down to a 4, nausea better, IV removed, pt getting dressed  1043- Pt given discharge instructions with Susy RHODES verbalized understanding. 1045- PT discharged walked out to car with MEAGAN Mckinney December.

## 2021-10-21 NOTE — ANESTHESIA PRE PROCEDURE
Department of Anesthesiology  Preprocedure Note       Name:  Edda Castillo   Age:  46 y.o.  :  1980                                          MRN:  [id number]         Date:  10/21/2021      Surgeon: Rah Orellana):  Merary Cardoza MD    Procedure: Procedure(s):  DILATATION AND CURETTAGE HYSTEROSCOPY, MYOSURE ENDOMETRIAL ABLATION    Medications prior to admission:   Prior to Admission medications    Medication Sig Start Date End Date Taking? Authorizing Provider   hydroxychloroquine (PLAQUENIL) 200 MG tablet Take 1 tablet by mouth once daily 21  Yes Jennifer Bound, APRN - CNP   Multiple Vitamins-Minerals (MULTIVITAMIN ADULT PO) Take by mouth   Yes Historical Provider, MD   Acetaminophen (TYLENOL ARTHRITIS PAIN PO) Take by mouth   Yes Historical Provider, MD   Cholecalciferol (VITAMIN D) 2000 UNITS CAPS capsule Take 1 capsule by mouth. Yes Historical Provider, MD       Current medications:    Current Facility-Administered Medications   Medication Dose Route Frequency Provider Last Rate Last Admin    ketorolac (TORADOL) injection 30 mg  30 mg IntraVENous Once Merary Cardoza MD         Facility-Administered Medications Ordered in Other Encounters   Medication Dose Route Frequency Provider Last Rate Last Admin    midazolam (VERSED) injection   IntraVENous PRN López Caro, DO   2 mg at 10/21/21 0809    fentaNYL (SUBLIMAZE) injection   IntraVENous PRN López Caro, DO   50 mcg at 10/21/21 0810    propofol injection   IntraVENous PRN López Caro, DO   150 mg at 10/21/21 0810    0.9 % sodium chloride infusion   IntraVENous Continuous PRN López Caro, DO   New Bag at 10/21/21 3589       Allergies:     Allergies   Allergen Reactions    Sulfa Antibiotics Rash       Problem List:    Patient Active Problem List   Diagnosis Code    Normal  O80    Psoriatic arthritis ([address] [address] [address] L40.50    Advanced maternal age in multigravida O12.46    Menorrhagia N92.0       Past Medical History:        Diagnosis Date    Asthma     when in high school currently takes no meds, exercised induced    Psoriatic arthritis ([address]     autoimmume       Past Surgical History:        Procedure Laterality Date    KNEE ARTHROSCOPY      TONSILLECTOMY      WISDOM TOOTH EXTRACTION         Social History:    Social History     Tobacco Use    Smoking status: Never Smoker    Smokeless tobacco: Never Used   Substance Use Topics    Alcohol use: Yes     Comment: rare                                Counseling given: Not Answered      Vital Signs (Current):   Vitals:    10/15/21 1339 10/21/21 0721   BP:  116/71   Pulse:  87   Resp:  16   Temp:  98.2 °F (36.8 °C)   TempSrc:  Skin   SpO2:  96%   Weight: 137 lb (62.1 kg) 138 lb 12.8 oz (63 kg)   Height: 5' 2\" (1.575 m) 5' 2\" (1.575 m)                                              BP Readings from Last 3 Encounters:   10/21/21 116/71   10/21/21 (!) 85/52   12/03/20 104/62       NPO Status: Time of last liquid consumption: 2200                        Time of last solid consumption: 2030                        Date of last liquid consumption: 10/20/21                        Date of last solid food consumption: 10/20/21    BMI:   Wt Readings from Last 3 Encounters:   10/21/21 138 lb 12.8 oz (63 kg)   12/03/20 144 lb (65.3 kg)   01/09/20 136 lb 9.6 oz (62 kg)     Body mass index is 25.39 kg/m².     CBC:   Lab Results   Component Value Date    WBC 5.8 10/19/2021    RBC 4.34 10/19/2021    RBC 4.29 06/23/2016    HGB 14.7 10/19/2021    HCT 45.5 10/19/2021    .8 10/19/2021    RDW 11.4 10/19/2021    PLT See Reflexed IPF Result 10/19/2021       CMP:   Lab Results   Component Value Date     12/03/2020    K 4.0 12/03/2020     12/03/2020    CO2 26 12/03/2020    BUN 17 12/03/2020    CREATININE 0.6 12/03/2020    LABGLOM >90 12/03/2020    GLUCOSE 86 12/03/2020    PROT 6.7 12/03/2020    CALCIUM 9.4 12/03/2020    BILITOT 0.3 12/03/2020    ALKPHOS 41 12/03/2020 AST 18 12/03/2020    ALT 17 12/03/2020       POC Tests: No results for input(s): POCGLU, POCNA, POCK, POCCL, POCBUN, POCHEMO, POCHCT in the last 72 hours. Coags:   Lab Results   Component Value Date    PROTIME 12.7 08/29/2019       HCG (If Applicable):   Lab Results   Component Value Date    PREGTESTUR negative 10/21/2021        ABGs: No results found for: PHART, PO2ART, SCZ6QVY, JLC9JBG, BEART, Z5BKXMPJ     Type & Screen (If Applicable):  Lab Results   Component Value Date    LABABO O 06/23/2016    [address] POS 01/16/2017       Drug/Infectious Status (If Applicable):  No results found for: HIV, HEPCAB    COVID-19 Screening (If Applicable): No results found for: COVID19        Anesthesia Evaluation  Patient summary reviewed and Nursing notes reviewed no history of anesthetic complications:   Airway: Mallampati: II  TM distance: >3 FB   Neck ROM: full  Mouth opening: > = 3 FB Dental:          Pulmonary:normal exam  breath sounds clear to auscultation  (+) asthma:                            Cardiovascular:Negative CV ROS  Exercise tolerance: good (>4 METS),                     Neuro/Psych:   Negative Neuro/Psych ROS              GI/Hepatic/Renal: Neg GI/Hepatic/Renal ROS            Endo/Other: Negative Endo/Other ROS             Pt had no PAT visit       Abdominal:             Vascular: negative vascular ROS. Other Findings:             Anesthesia Plan      general     ASA 2       Induction: intravenous. MIPS: Postoperative opioids intended and Prophylactic antiemetics administered. Anesthetic plan and risks discussed with patient and spouse.                       [address], DO   10/21/2021

## 2021-10-21 NOTE — ANESTHESIA POSTPROCEDURE EVALUATION
Department of Anesthesiology  Postprocedure Note    Patient: Loren Etienne  MRN: [id number]  YOB: 1980  Date of evaluation: 10/21/2021  Time:  9:05 AM     Procedure Summary     Date: 10/21/21 Room / Location: [address] [place] [address] 04 / 138 [facility]    Anesthesia Start: Lucy Lucia Anesthesia Stop: 4711    Procedure: DILATATION AND CURETTAGE HYSTEROSCOPY, MYOSURE ENDOMETRIAL ABLATION (N/A ) Diagnosis: (MENORRHAGIA)    Surgeons: Mary Waddell MD Responsible Provider: Denise Martins DO    Anesthesia Type: general ASA Status: 2          Anesthesia Type: general    Antoine Phase I: Antoine Score: 10    Antoine Phase II:      Last vitals: Reviewed and per EMR flowsheets.        Anesthesia Post Evaluation    Patient location during evaluation: PACU  Patient participation: complete - patient participated  Level of consciousness: awake and alert  Pain score: 2  Airway patency: patent  Nausea & Vomiting: no vomiting and nausea (treated with IV zofran)  Complications: no  Cardiovascular status: hemodynamically stable and blood pressure returned to baseline  Respiratory status: spontaneous ventilation, room air and acceptable  Hydration status: stable

## 2021-10-21 NOTE — DISCHARGE SUMMARY
GYN Surgery  Discharge Summary     Patient ID:  Cristian Pickens  [id number]  31 y.o.  1980    Admit date: 10/21/2021    Admitting Physician: Melba Cazares MD    Discharge Diagnoses: MENORRHAGIA    Discharged Condition: good      Procedures Performed: D&C/H Endometrial ablation    Hospital Course: Patient was admitted on the day of surgery, and underwent an uncomplicated procedure. See dictated op note. Disposition: home    Patient Instructions: Activity: activity as tolerated and no sex for 2 weeks  Diet: regular  Wound Care: as directed    Discharge Medication:  [facility] Medication Instructions BBE:[id number]    Printed on:10/21/21 4216   Medication Information                      Acetaminophen (TYLENOL ARTHRITIS PAIN PO)  Take by mouth             Cholecalciferol (VITAMIN D) 2000 UNITS CAPS capsule  Take 1 capsule by mouth.              hydroxychloroquine (PLAQUENIL) 200 MG tablet  Take 1 tablet by mouth once daily             Multiple Vitamins-Minerals (MULTIVITAMIN ADULT PO)  Take by mouth                  Discharge Date: 10/21/21    Condition: Good    Follow-up with Melba Cazares MD in 1 week    Signed:  Electronically signed by Melba Cazares MD on 10/21/2021 at 8:38 AM

## 2021-10-21 NOTE — OP NOTE
Gyn Service    Operative Report        Pt Name: Colin Duong  MRN: [id number] Alice #: [de-identified]  YOB: 1980  Procedure Performed By: Narciso Richards MD, MD      Pre-operative Diagnosis:  MENORRHAGIA    Post-operative Diagnosis:  SAME    PMH:  Past Medical History:   Diagnosis Date    Asthma     when in high school currently takes no meds, exercised induced    Psoriatic arthritis ([address]     autoimmume       Procedure:  HYSTEROSCOPY, D AND C, LEIA ENDOMETRIAL ABLATION    Surgeon:  Narciso Richards MD     Anesthesia:  General    Estimated blood loss: 5cc    Findings:  Ut sound to 8 cm with a cavity length of 4.5cm. Complications:  NONE    Specimens: Endometrial curettings    Procedure: The patient was taken to the operating room where she was placed  under general anesthesia in dorsolithotomy position, prepped and draped. On  exam, the cervix was mobile and in mid position. The cervix was grasped with  a single-tooth tenaculum and sounded to 8 cm and was progressively dilated. A hysteroscope was placed and normal saline used as the distension medium. Abnormalities of the uterus were not noted. A sharp curettage took place in a 360 degree manner until a gritty texture was noted. A moderate amount of tissue was obtained. The Leia device  was then placed, the cavity assessed successfully, it was enabled and then  started. It ran for the full 2 minutes. The hysteroscopic camera was placed into the uterus and an adequate burn was achieved. When it was completed the patient was awakened from general anesthesia and taken to the Recovery Room in good condition.         Narciso Richards MD, MD 10/21/2021 8:39 AM

## 2021-10-21 NOTE — H&P
[address] [address]  History and Physicial      Patient:  Sunita Wise  YOB: 1980  MRN: [id number]     Acct: [de-identified]    HISTORY OF PRESENT ILLNESS:     Sunita Wise is a 46 y.o. female [id number] with menorrhagia. Obstetric History: # 1 - Date: 02/01/06, Sex: None, Weight: None, GA: None, Delivery: None, Apgar1: None, Apgar5: None, Living: None, Birth Comments: None    # 2 - Date: 04/08/07, Sex: Male, Weight: 6 lb 13 oz (3.09 kg), GA: None, Delivery: Vaginal, Forceps, Apgar1: None, Apgar5: None, Living: Living, Birth Comments: failed vacuum    # 3 - Date: 09/18/09, Sex: Male, Weight: 7 lb 13 oz (3.544 kg), GA: None, Delivery: Vaginal, Spontaneous, Apgar1: None, Apgar5: None, Living: Living, Birth Comments: None    # 4 - Date: 07/26/13, Sex: Male, Weight: 7 lb 0.5 oz (3.19 kg), GA: 38w4d, Delivery: Vaginal, Spontaneous, Apgar1: 9, Apgar5: 9, Living: Living, Birth Comments: None    # 5 - Date: 01/16/17, Sex: Male, Weight: None, GA: 38w6d, Delivery: Vaginal, Spontaneous, Apgar1: None, Apgar5: None, Living: Living, Birth Comments: None      Past Medical History:        Diagnosis Date    Asthma     when in high school currently takes no meds, exercised induced    Psoriatic arthritis ([facility] [address]     autoimmume       Past Surgical History:        Procedure Laterality Date    KNEE ARTHROSCOPY      TONSILLECTOMY      WISDOM TOOTH EXTRACTION         Medications: Scheduled Meds:   ketorolac  30 mg IntraVENous Once    ketorolac         Continuous Infusions:  PRN Meds:. Allergies:  Sulfa antibiotics    Social History:    reports that she has never smoked. She has never used smokeless tobacco. She reports current alcohol use. She reports that she does not use drugs.     Family History:       Problem Relation Age of Onset    High Blood Pressure Mother     High Blood Pressure Father     Asthma Father     Arthritis Father     Arthritis Paternal Aunt         psa    Psoriasis Paternal Health Maintenance Due   Topic Date Due   • Shingles Vaccine (3 of 3) 09/16/2019   • Medicare Wellness Visit  01/08/2021       Patient did get Flu Shot [facility] 08-27-20  Per pt shingles shots complete.    Uncle     Asthma Child     Migraines Child        Review of Systems:  General ROS: negative  Respiratory ROS: negative  Cardiovascular ROS: negative  Gastrointestinal ROS: negative  Musculoskeletal ROS: negative  Neurological ROS: negative    Physical Exam:    Vitals:  Patient Vitals for the past 24 hrs:   BP Temp Temp src Pulse Resp SpO2 Height Weight   10/21/21 0721 116/71 98.2 °F (36.8 °C) Skin 87 16 96 % 5' 2\" (1.575 m) 138 lb 12.8 oz (63 kg)          Wt Readings from Last 1 Encounters:   10/21/21 138 lb 12.8 oz (63 kg)         General appearance - alert, well appearing, and in no distress  Abdomen - soft, nontender, nondistended, no masses or organomegaly  Pelvic - deferred  Neurological - alert, oriented, normal speech, no focal findings or movement disorder noted  Musculoskeletal - no joint tenderness, deformity or swelling  Extremities - peripheral pulses normal, no pedal edema, no clubbing or cyanosis  Skin - normal coloration and turgor, no rashes, no suspicious skin lesions noted      Review of Labs and Diagnostic Testing:      CBC:   Lab Results   Component Value Date    WBC 5.8 10/19/2021    RBC 4.34 10/19/2021    RBC 4.29 2016    HGB 14.7 10/19/2021    HCT 45.5 10/19/2021    .8 10/19/2021    RDW 11.4 10/19/2021    PLT See Reflexed IPF Result 10/19/2021         Radiology:        Assessment:    Patient Active Problem List   Diagnosis    Normal     Psoriatic arthritis ([facility] [address]    Advanced maternal age in multigravida    Menorrhagia         Plan:  1.D&C/H with endometrial ablation      Electronically signed by Wendy Phillips MD on 10/21/2021 at 7:59 AM

## 2021-11-08 DIAGNOSIS — M35.9 UNDIFFERENTIATED CONNECTIVE TISSUE DISEASE (HCC): ICD-10-CM

## 2021-11-08 RX ORDER — HYDROXYCHLOROQUINE SULFATE 200 MG/1
TABLET, FILM COATED ORAL
Qty: 90 TABLET | Refills: 0 | Status: SHIPPED | OUTPATIENT
Start: 2021-11-08 | End: 2022-02-16

## 2021-11-23 DIAGNOSIS — U07.1 COVID-19: ICD-10-CM

## 2021-11-23 RX ORDER — HEPARIN SODIUM (PORCINE) LOCK FLUSH IV SOLN 100 UNIT/ML 100 UNIT/ML
500 SOLUTION INTRAVENOUS PRN
OUTPATIENT
Start: 2021-11-23

## 2021-11-23 RX ORDER — SODIUM CHLORIDE 9 MG/ML
25 INJECTION, SOLUTION INTRAVENOUS PRN
OUTPATIENT
Start: 2021-11-23

## 2021-11-23 RX ORDER — DIPHENHYDRAMINE HYDROCHLORIDE 50 MG/ML
50 INJECTION INTRAMUSCULAR; INTRAVENOUS
Status: CANCELLED | OUTPATIENT
Start: 2021-11-23

## 2021-11-23 RX ORDER — EPINEPHRINE 1 MG/ML
0.3 INJECTION, SOLUTION, CONCENTRATE INTRAVENOUS PRN
Status: CANCELLED | OUTPATIENT
Start: 2021-11-23

## 2021-11-23 RX ORDER — SODIUM CHLORIDE 0.9 % (FLUSH) 0.9 %
5-40 SYRINGE (ML) INJECTION PRN
Status: CANCELLED | OUTPATIENT
Start: 2021-11-23

## 2021-11-23 RX ORDER — ONDANSETRON 2 MG/ML
8 INJECTION INTRAMUSCULAR; INTRAVENOUS
Status: CANCELLED | OUTPATIENT
Start: 2021-11-23

## 2021-11-23 RX ORDER — ALBUTEROL SULFATE 90 UG/1
4 AEROSOL, METERED RESPIRATORY (INHALATION) PRN
Status: CANCELLED | OUTPATIENT
Start: 2021-11-23

## 2021-11-23 RX ORDER — SODIUM CHLORIDE 9 MG/ML
INJECTION, SOLUTION INTRAVENOUS CONTINUOUS
Status: CANCELLED | OUTPATIENT
Start: 2021-11-23

## 2021-11-23 RX ORDER — ACETAMINOPHEN 325 MG/1
650 TABLET ORAL
Status: CANCELLED | OUTPATIENT
Start: 2021-11-23

## 2021-11-23 NOTE — PROGRESS NOTES
REGEN-COV ordered by a non-privileged provider  - Lay Ortega CNP     1. Vaccine status - Unvaccinated   2. Date of Positive SARS-CoV-2 test - 11/23  3. Symptom onset - 11/22  4. Criteria met for REGEN-COV - BMI>25  5. O2 sat on room air - not available  6. EUA and side effects have been reviewed either verbally or given to patient Yes  7.  Provider phone # if questions arise - [phone]

## 2021-11-27 ENCOUNTER — HOSPITAL ENCOUNTER (OUTPATIENT)
Dept: NURSING | Age: 41
Discharge: HOME | End: 2021-11-27
Payer: COMMERCIAL

## 2021-11-27 VITALS
RESPIRATION RATE: 18 BRPM | HEART RATE: 90 BPM | OXYGEN SATURATION: 99 % | TEMPERATURE: 99 F | DIASTOLIC BLOOD PRESSURE: 61 MMHG | SYSTOLIC BLOOD PRESSURE: 96 MMHG

## 2021-11-27 DIAGNOSIS — U07.1 COVID-19: Primary | ICD-10-CM

## 2021-11-27 PROCEDURE — 2580000003 HC RX 258: Performed by: INTERNAL MEDICINE

## 2021-11-27 PROCEDURE — 6360000002 HC RX W HCPCS: Performed by: INTERNAL MEDICINE

## 2021-11-27 PROCEDURE — M0245 HC IV INFUSION BAMLANIVIMAB & ETESEVIMAB W/MONITORING: HCPCS

## 2021-11-27 RX ORDER — HEPARIN SODIUM (PORCINE) LOCK FLUSH IV SOLN 100 UNIT/ML 100 UNIT/ML
500 SOLUTION INTRAVENOUS PRN
OUTPATIENT
Start: 2021-11-27

## 2021-11-27 RX ORDER — ACETAMINOPHEN 325 MG/1
650 TABLET ORAL
Status: ACTIVE | OUTPATIENT
Start: 2021-11-27 | End: 2021-11-27

## 2021-11-27 RX ORDER — SODIUM CHLORIDE 0.9 % (FLUSH) 0.9 %
5-40 SYRINGE (ML) INJECTION PRN
Status: CANCELLED | OUTPATIENT
Start: 2021-11-27

## 2021-11-27 RX ORDER — ONDANSETRON 2 MG/ML
8 INJECTION INTRAMUSCULAR; INTRAVENOUS
Status: CANCELLED | OUTPATIENT
Start: 2021-11-27

## 2021-11-27 RX ORDER — SODIUM CHLORIDE 0.9 % (FLUSH) 0.9 %
5-40 SYRINGE (ML) INJECTION PRN
Status: DISCONTINUED | OUTPATIENT
Start: 2021-11-27 | End: 2021-11-28 | Stop reason: HOSPADM

## 2021-11-27 RX ORDER — ACETAMINOPHEN 325 MG/1
650 TABLET ORAL
Status: CANCELLED | OUTPATIENT
Start: 2021-11-27

## 2021-11-27 RX ORDER — SODIUM CHLORIDE 9 MG/ML
INJECTION, SOLUTION INTRAVENOUS CONTINUOUS
Status: CANCELLED | OUTPATIENT
Start: 2021-11-27

## 2021-11-27 RX ORDER — SODIUM CHLORIDE 9 MG/ML
25 INJECTION, SOLUTION INTRAVENOUS PRN
OUTPATIENT
Start: 2021-11-27

## 2021-11-27 RX ORDER — SODIUM CHLORIDE 9 MG/ML
INJECTION, SOLUTION INTRAVENOUS CONTINUOUS
Status: DISCONTINUED | OUTPATIENT
Start: 2021-11-27 | End: 2021-11-28 | Stop reason: HOSPADM

## 2021-11-27 RX ORDER — ALBUTEROL SULFATE 90 UG/1
4 AEROSOL, METERED RESPIRATORY (INHALATION) PRN
Status: CANCELLED | OUTPATIENT
Start: 2021-11-27

## 2021-11-27 RX ORDER — ALBUTEROL SULFATE 90 UG/1
4 AEROSOL, METERED RESPIRATORY (INHALATION) PRN
Status: DISCONTINUED | OUTPATIENT
Start: 2021-11-27 | End: 2021-11-28 | Stop reason: HOSPADM

## 2021-11-27 RX ORDER — DIPHENHYDRAMINE HYDROCHLORIDE 50 MG/ML
50 INJECTION INTRAMUSCULAR; INTRAVENOUS
Status: ACTIVE | OUTPATIENT
Start: 2021-11-27 | End: 2021-11-27

## 2021-11-27 RX ORDER — DIPHENHYDRAMINE HYDROCHLORIDE 50 MG/ML
50 INJECTION INTRAMUSCULAR; INTRAVENOUS
Status: CANCELLED | OUTPATIENT
Start: 2021-11-27

## 2021-11-27 RX ORDER — ONDANSETRON 2 MG/ML
8 INJECTION INTRAMUSCULAR; INTRAVENOUS
Status: ACTIVE | OUTPATIENT
Start: 2021-11-27 | End: 2021-11-27

## 2021-11-27 RX ADMIN — CASIRIVIMAB AND IMDEVIMAB: 600; 600 INJECTION, SOLUTION, CONCENTRATE INTRAVENOUS at 09:32

## 2021-11-27 RX ADMIN — SODIUM CHLORIDE: 9 INJECTION, SOLUTION INTRAVENOUS at 09:31

## 2022-02-16 ENCOUNTER — OFFICE VISIT (OUTPATIENT)
Dept: RHEUMATOLOGY | Age: 42
End: 2022-02-16
Payer: COMMERCIAL

## 2022-02-16 VITALS
OXYGEN SATURATION: 97 % | SYSTOLIC BLOOD PRESSURE: 110 MMHG | WEIGHT: 143.6 LBS | HEIGHT: 62 IN | BODY MASS INDEX: 26.43 KG/M2 | HEART RATE: 78 BPM | DIASTOLIC BLOOD PRESSURE: 72 MMHG

## 2022-02-16 DIAGNOSIS — Z51.81 MEDICATION MONITORING ENCOUNTER: ICD-10-CM

## 2022-02-16 DIAGNOSIS — M54.50 MIDLINE LOW BACK PAIN WITHOUT SCIATICA, UNSPECIFIED CHRONICITY: ICD-10-CM

## 2022-02-16 DIAGNOSIS — M72.2 PLANTAR FASCIITIS, BILATERAL: ICD-10-CM

## 2022-02-16 DIAGNOSIS — M35.9 UNDIFFERENTIATED CONNECTIVE TISSUE DISEASE (HCC): Primary | ICD-10-CM

## 2022-02-16 DIAGNOSIS — R06.09 DOE (DYSPNEA ON EXERTION): ICD-10-CM

## 2022-02-16 DIAGNOSIS — M35.9 UNDIFFERENTIATED CONNECTIVE TISSUE DISEASE (HCC): ICD-10-CM

## 2022-02-16 PROCEDURE — 99214 OFFICE O/P EST MOD 30 MIN: CPT | Performed by: INTERNAL MEDICINE

## 2022-02-16 RX ORDER — HYDROXYCHLOROQUINE SULFATE 200 MG/1
TABLET, FILM COATED ORAL
Qty: 90 TABLET | Refills: 3 | Status: SHIPPED | OUTPATIENT
Start: 2022-02-16 | End: 2022-08-17 | Stop reason: SDUPTHER

## 2022-02-16 RX ORDER — NAPROXEN 375 MG/1
375 TABLET ORAL 2 TIMES DAILY PRN
Qty: 180 TABLET | Refills: 1 | Status: SHIPPED | OUTPATIENT
Start: 2022-02-16

## 2022-02-16 ASSESSMENT — ENCOUNTER SYMPTOMS
WHEEZING: 0
DIARRHEA: 0
COUGH: 0
NAUSEA: 0
CONSTIPATION: 0
VOMITING: 0
EYE PAIN: 0
EYE REDNESS: 0
EYES NEGATIVE: 1
SHORTNESS OF BREATH: 0

## 2022-02-16 NOTE — PROGRESS NOTES
[facility] RHEUMATOLOGY FOLLOW UP NOTE       Date Of Service: 2/16/2022  Provider: Giovanni Villagran DO ,   PCP: James Sandoval MD   Name: Homar Figueroa   MRN: [id number]        History of Present Illness (HPI)     Chief Complaint   Patient presents with    1 Year Follow Up        Homar Figueroa  is a(n)42 y.o. female with a hx of PsA vs undifferentiated connective tissue disease, asthma, gastric ulcer previosuly for the f/u evaluation of undiff. inflammatory arthritis, medication monitoring    covid infxn -- pna on cxr-- cont. SOB with some insomnia b/c of SOB. NEUMANN after about 1 mild previously able to walk 3-5 miles w/o significant difficulty. + cough - dry (intermittent and slowly improving) -- agrevated with dry heat. -- + dry eyes - treated with eye drops and change of contacts -- s/p eval by eye care rpovider. -- left mid foot and MTPs pain since spring 2021 with constant aching pain with intermittent stabbing pain. - aggravated with intial wt bearing, Alleviating: wearing shoes, ibuprofen. Candelario Skates + AM stiffness lasting 10 minutes. Denies joints welling. Intermittent numbness in the bilateral lower legs - no known preciptating factores. fmhx - father - psoriasis, pat uncle and pat cousing w/ psoriatic arthritis. REVIEW OF SYSTEMS: (ROS)    Review of Systems   Constitutional: Negative for diaphoresis, fatigue and fever. HENT: Negative for congestion, hearing loss and nosebleeds. Eyes: Negative. Negative for pain and redness. Respiratory: Negative for cough, shortness of breath and wheezing. Cardiovascular: Negative. Negative for chest pain. Gastrointestinal: Negative for constipation, diarrhea, nausea and vomiting. Genitourinary: Negative for difficulty urinating, frequency and hematuria. Musculoskeletal: Negative for myalgias. Skin: Negative for rash. Neurological: Negative for dizziness, weakness and headaches. Hematological: Does not bruise/bleed easily. Psychiatric/Behavioral: Negative for sleep disturbance. The patient is not nervous/anxious. PAST MEDICAL HISTORY     has a past medical history of Asthma and Psoriatic arthritis ([address] PAST SURGICAL HISTORY     has a past surgical history that includes P[place] tooth extraction; Tonsillectomy; Knee arthroscopy; and Dilation and curettage of uterus (N/A, 10/21/2021). Lenny Wyatt FAMILY HISTORY    Reviewed and updated in chart. SOCIAL HISTORY     reports that she has never smoked. She has never used smokeless tobacco. She reports current alcohol use. She reports that she does not use drugs. ALLERGIES     Allergies   Allergen Reactions    Sulfa Antibiotics Rash       CURRENT MEDICATIONS      Current Outpatient Medications:     hydroxychloroquine (PLAQUENIL) 200 MG tablet, Take 1 tablet by mouth once daily, Disp: 90 tablet, Rfl: 0    Multiple Vitamins-Minerals (MULTIVITAMIN ADULT PO), Take by mouth, Disp: , Rfl:     Acetaminophen (TYLENOL ARTHRITIS PAIN PO), Take by mouth, Disp: , Rfl:     Cholecalciferol (VITAMIN D) 2000 UNITS CAPS capsule, Take 1 capsule by mouth., Disp: , Rfl:     PHYSICAL EXAMINATION / OBJECTIVE     Objective:  /72 (Site: Left Upper Arm, Position: Sitting, Cuff Size: Medium Adult)   Pulse 78   Ht 5' 2.01\" (1.575 m)   Wt 143 lb 9.6 oz (65.1 kg)   SpO2 97%   BMI 26.26 kg/m²     General Appearance: General appearance:  AAO x 3 ,  well-developed and well nourished  Head: NCAT  Eyes: No abnormalities. ,  Sclera non-icteric,   Ears / Nose:  normal  appearance  ears and nose. No active drainage from nose. Mouth:  MMM, ears w/o deformities  Neck: No jugular venous distention, appears symmetric, good ROM  Lymph: no cervical adenopathy   Pulmonary/Chest: CTA bilateral ,  symmetric chest expansion. Cardiovascular: Normal S1 and S2, NO murmur, rub, gallop  Neurologic: Speech normal, no facial droop,  Skin: NO rash on exposed skin.    Musculoskeletal:  Upper extremities:  SHOULDERS ,  ELBOWS ,  WRISTS ,  HANDS/FINGERS - non-tender, no swelling      Lower extremities:  HIPS  KNEES     ANKLES  - tinel Right medial ankles      FEET : MTP compression bilat. , tender mid sole left         Exam KEY:   Tender : T    Swelling: S,   Deformities: D,   Non-tender : NT  ,  No swelling: NS       RAPID 3:   2/16/2022 --- RAPID 3: 0 + 0 + 0 = 0       Remission: <3  Low Disease Activity: <6  Moderate Disease Activity: >=6 and <=12  High Disease Activity: >12     LABS      Lab Results   Component Value Date    WBC 5.8 10/19/2021    HGB 14.7 10/19/2021    .8 (H) 10/19/2021    MCHC 32.3 10/19/2021    RDW 11.4 (L) 10/19/2021    PLT See Reflexed IPF Result 10/19/2021    NEUTROABS 4.18 10/19/2021    LYMPHSABS 1.06 (L) 10/19/2021    EOSABS 0.10 10/19/2021    BASOSABS 0.04 10/19/2021         Chemistry        Component Value Date/Time     12/03/2020 1009    K 4.0 12/03/2020 1009     12/03/2020 1009    CO2 26 12/03/2020 1009    BUN 17 12/03/2020 1009    CREATININE 0.6 12/03/2020 1009        Component Value Date/Time    CALCIUM 9.4 12/03/2020 1009    ALKPHOS 41 12/03/2020 1009    AST 18 12/03/2020 1009    ALT 17 12/03/2020 1009    BILITOT 0.3 12/03/2020 1009            Lab Results   Component Value Date    SEDRATE 2 12/03/2020    SEDRATE 2 08/29/2019    CRP 0.04 12/03/2020    CRP 0.07 08/29/2019       No results found for: VITD25    No results found for: ANASCRN  No results found for: SSA  No results found for: SSB  No results found for: ANTI-SMITH  No results found for: DSDNAAB   No results found for: ANTIRNP  No results found for: C3, C4  No results found for: CCPAB  No results found for: RF        RADIOLOGY / PROCEDURES:     ASSESSMENT/PLAN:     Undifferentiated connective tissue disease -  -- +TIMUR, neg subserologies, strong fmhx of psoriasis & PsA   -- plaquenil 200 mg daily    -- declined additional medications.  --- Methotrexate, Sulfasalazine , arava    -- discussed start naproxen 375mg twice daily.      bilat foot pain - tender MTPs bilat, and mid soles. - ? Plantar fasciitis and inflammatory arthritis. -- discussed shoe inserts and stretching. Chronic low back pain --   - intermittent, localized, + shober testing. MRI w/o abnormalities. - xray more consistent with osteitis condensans    NEUMANN: s/p covid nov. 2021 - continued but slowly improving sx's. - repeat cxr to evaluate for regulation of the pulmonary infiltrates. Medication monitoring   - labs feb 2022.     - plaquenil eye exam (Dr. Nicko Lopez in [place])- request records

## 2022-02-16 NOTE — PATIENT INSTRUCTIONS
Patient Education        Plantar Fasciitis: Care Instructions  Overview     Plantar fasciitis is pain and inflammation of the plantar fascia, the tissue at the bottom of your foot that connects the heel bone to the toes. The plantar fascia also supports the arch. If you strain the plantar fascia, it can develop small tears and cause heel pain when you stand or walk. Plantar fasciitis can be caused by running or other sports. It also may occur in people who are overweight or who have high arches or flat feet. You may get plantar fasciitis if you walk or stand for long periods, or have a tight Achilles tendon or calf muscles. You can improve your foot pain with rest and other care at home. It might take a few weeks to a few months for your foot to heal completely. Follow-up care is a key part of your treatment and safety. Be sure to make and go to all appointments, and call your doctor if you are having problems. It's also a good idea to know your test results and keep a list of the medicines you take. How can you care for yourself at home? · Rest your feet often. Reduce your activity to a level that lets you avoid pain. If possible, do not run or walk on hard surfaces. · Take pain medicines exactly as directed. ? If the doctor gave you a prescription medicine for pain, take it as prescribed. ? If you are not taking a prescription pain medicine, take an over-the-counter anti-inflammatory medicine for pain and swelling, such as ibuprofen (Advil, Motrin) or naproxen (Aleve). Read and follow all instructions on the label. · Use ice massage to help with pain and swelling. You can use an ice cube or an ice cup several times a day. To make an ice cup, fill a paper cup with water and freeze it. Cut off the top of the cup until a half-inch of ice shows. Hold onto the remaining paper to use the cup. Rub the ice in small circles over the area for 5 to 7 minutes.   · Contrast baths, which alternate hot and cold water, can also help reduce swelling. But because heat alone may make pain and swelling worse, end a contrast bath with a soak in cold water. · Wear a night splint if your doctor suggests it. A night splint holds your foot with the toes pointed up and the foot and ankle at a 90-degree angle. This position gives the bottom of your foot a constant, gentle stretch. · Do simple exercises such as calf stretches and towel stretches 2 to 3 times each day, especially when you first get up in the morning. These can help the plantar fascia become more flexible. They also make the muscles that support your arch stronger. Hold these stretches for 15 to 30 seconds per stretch. Repeat 2 to 4 times. ? Stand about 1 foot from a wall. Place the palms of both hands against the wall at chest level. Lean forward against the wall, keeping one leg with the knee straight and heel on the ground while bending the knee of the other leg.  ? Sit down on the floor or a mat with your feet stretched in front of you. Roll up a towel lengthwise, and loop it over the ball of your foot. Holding the towel at both ends, gently pull the towel toward you to stretch your foot. · Wear shoes with good arch support. Athletic shoes or shoes with a well-cushioned sole are good choices. · Replace athletic shoes regularly. · Try heel cups or shoe inserts (orthotics) to help cushion your heel. You can buy these at many shoe stores. · Put on your shoes as soon as you get out of bed. Going barefoot or wearing slippers may make your pain worse. · Reach and stay at a good weight for your height. This puts less strain on your feet. When should you call for help? Call your doctor now or seek immediate medical care if:    · You have heel pain with fever, redness, or warmth in your heel.     · You cannot put weight on the sore foot.    Watch closely for changes in your health, and be sure to contact your doctor if:    · You have numbness or tingling in your heel.     · Your heel pain lasts more than 2 weeks. Where can you learn more? Go to https://chpepiceweb.VISUALPLANT. org and sign in to your [employer] account. Enter I484 in the [facility] box to learn more about \"Plantar Fasciitis: Care Instructions. \"     If you do not have an account, please click on the \"Sign Up Now\" link. Current as of: July 1, 2021               Content Version: 13.1  © 2006-2021 [employer]. Care instructions adapted under license by [facility] ([facility]). If you have questions about a medical condition or this instruction, always ask your healthcare professional. [place] [address] any warranty or liability for your use of this information. Patient Education        Plantar Fasciitis: Exercises  Introduction  Here are some examples of exercises for you to try. The exercises may be suggested for a condition or for rehabilitation. Start each exercise slowly. Ease off the exercises if you start to have pain. You will be told when to start these exercises and which ones will work best for you. How to do the exercises  Towel stretch    1. Sit with your legs extended and knees straight. 2. Place a towel around your foot just under the toes. 3. Hold each end of the towel in each hand, with your hands above your knees. 4. Pull back with the towel so that your foot stretches toward you. 5. Hold the position for at least 15 to 30 seconds. 6. Repeat 2 to 4 times a session, up to 5 sessions a day. Calf stretch    This exercise stretches the muscles at the back of the lower leg (the calf) and the Achilles tendon. Do this exercise 3 or 4 times a day, 5 days a week. 1. Stand facing a wall with your hands on the wall at about eye level. Put the leg you want to stretch about a step behind your other leg. 2. Keeping your back heel on the floor, bend your front knee until you feel a stretch in the back leg. 3. Hold the stretch for 15 to 30 seconds.  Repeat 2 to 4 times.  Plantar fascia and calf stretch    Stretching the plantar fascia and calf muscles can increase flexibility and decrease heel pain. You can do this exercise several times each day and before and after activity. 1. Stand on a step as shown above. Be sure to hold on to the banister. 2. Slowly let your heels down over the edge of the step as you relax your calf muscles. You should feel a gentle stretch across the bottom of your foot and up the back of your leg to your knee. 3. Hold the stretch about 15 to 30 seconds, and then tighten your calf muscle a little to bring your heel back up to the level of the step. Repeat 2 to 4 times. Towel curls    Make this exercise more challenging by placing a weighted object, such as a soup can, on the other end of the towel. 1. While sitting, place your foot on a towel on the floor and scrunch the towel toward you with your toes. 2. Then, also using your toes, push the towel away from you. Polebridge pickups    1. Put marbles on the floor next to a cup.  2. Using your toes, try to lift the marbles up from the floor and put them in the cup. Follow-up care is a key part of your treatment and safety. Be sure to make and go to all appointments, and call your doctor if you are having problems. It's also a good idea to know your test results and keep a list of the medicines you take. Where can you learn more? Go to https://Lambda Solutions.Beryllium. org and sign in to your [employer] account. Enter N380 in the [facility] box to learn more about \"Plantar Fasciitis: Exercises. \"     If you do not have an account, please click on the \"Sign Up Now\" link. Current as of: July 1, 2021               Content Version: 13.1  © 2006-2021 Healthwise, Incorporated. Care instructions adapted under license by [facility] ([facility]).  If you have questions about a medical condition or this instruction, always ask your healthcare professional. [address] any warranty or liability for your use of this information.

## 2022-02-24 LAB
ALBUMIN SERPL-MCNC: 4.6 G/DL
ALP BLD-CCNC: 39 U/L
ALT SERPL-CCNC: 16 U/L
ANION GAP SERPL CALCULATED.3IONS-SCNC: 11 MMOL/L
AST SERPL-CCNC: 16 U/L
BASOPHILS ABSOLUTE: 0 /ΜL
BASOPHILS RELATIVE PERCENT: 0.8 %
BILIRUB SERPL-MCNC: 0.4 MG/DL (ref 0.1–1.4)
BUN BLDV-MCNC: 21 MG/DL
C-REACTIVE PROTEIN: 0.08
CALCIUM SERPL-MCNC: 9.4 MG/DL
CHLORIDE BLD-SCNC: 106 MMOL/L
CO2: 27 MMOL/L
CREAT SERPL-MCNC: 0.8 MG/DL
EOSINOPHILS ABSOLUTE: 0.1 /ΜL
EOSINOPHILS RELATIVE PERCENT: 1.3 %
GFR CALCULATED: 60
GLUCOSE BLD-MCNC: 83 MG/DL
HCT VFR BLD CALC: 43.5 % (ref 36–46)
HEMOGLOBIN: 15.2 G/DL (ref 12–16)
LYMPHOCYTES ABSOLUTE: 1.7 /ΜL
LYMPHOCYTES RELATIVE PERCENT: 27.8 %
MCH RBC QN AUTO: 34.8 PG
MCHC RBC AUTO-ENTMCNC: 35 G/DL
MCV RBC AUTO: 99.7 FL
MONOCYTES ABSOLUTE: 0.4 /ΜL
MONOCYTES RELATIVE PERCENT: 6.5 %
NEUTROPHILS ABSOLUTE: 3.9 /ΜL
NEUTROPHILS RELATIVE PERCENT: 63.6 %
PDW BLD-RTO: 11.8 %
PLATELET # BLD: 152 K/ΜL
PMV BLD AUTO: 9.6 FL
POTASSIUM SERPL-SCNC: 4.2 MMOL/L
RBC # BLD: 4.37 10^6/ΜL
SEDIMENTATION RATE, ERYTHROCYTE: 3
SODIUM BLD-SCNC: 140 MMOL/L
TOTAL PROTEIN: 7
WBC # BLD: 6.1 10^3/ML

## 2022-02-28 DIAGNOSIS — R06.09 DOE (DYSPNEA ON EXERTION): ICD-10-CM

## 2022-08-17 ENCOUNTER — OFFICE VISIT (OUTPATIENT)
Dept: RHEUMATOLOGY | Age: 42
End: 2022-08-17
Payer: COMMERCIAL

## 2022-08-17 VITALS
BODY MASS INDEX: 25.95 KG/M2 | HEART RATE: 78 BPM | OXYGEN SATURATION: 99 % | WEIGHT: 141 LBS | DIASTOLIC BLOOD PRESSURE: 78 MMHG | SYSTOLIC BLOOD PRESSURE: 116 MMHG | HEIGHT: 62 IN

## 2022-08-17 DIAGNOSIS — M72.2 PLANTAR FASCIITIS, BILATERAL: ICD-10-CM

## 2022-08-17 DIAGNOSIS — M54.50 MIDLINE LOW BACK PAIN WITHOUT SCIATICA, UNSPECIFIED CHRONICITY: ICD-10-CM

## 2022-08-17 DIAGNOSIS — Z51.81 MEDICATION MONITORING ENCOUNTER: ICD-10-CM

## 2022-08-17 DIAGNOSIS — M35.9 UNDIFFERENTIATED CONNECTIVE TISSUE DISEASE (HCC): Primary | ICD-10-CM

## 2022-08-17 LAB
ABSOLUTE BASO #: 0.06 K/UL (ref 0–0.2)
ABSOLUTE EOS #: 0.12 K/UL (ref 0–0.5)
ABSOLUTE LYMPH #: 1.7 K/UL (ref 1–4)
ABSOLUTE MONO #: 0.33 K/UL (ref 0.2–1)
ABSOLUTE NEUT #: 3.02 K/UL (ref 1.5–7.5)
BASOPHILS RELATIVE PERCENT: 1.1 %
EOSINOPHILS RELATIVE PERCENT: 2.3 %
HCT VFR BLD CALC: 43.7 % (ref 34–45)
HEMOGLOBIN: 15.1 G/DL (ref 11.5–15.5)
LYMPHOCYTE %: 32.4 %
MCH RBC QN AUTO: 33.4 PG (ref 25–33)
MCHC RBC AUTO-ENTMCNC: 34.6 G/DL (ref 31–36)
MCV RBC AUTO: 96.7 FL (ref 80–99)
MONOCYTES # BLD: 6.3 %
NEUTROPHILS RELATIVE PERCENT: 57.5 %
PDW BLD-RTO: 11.6 % (ref 11.5–15)
PLATELETS: 154 K/UL (ref 130–400)
PMV BLD AUTO: 13.5 FL (ref 9.3–13)
RBC: 4.52 M/UL (ref 3.8–5.4)
SEDIMENTATION RATE, ERYTHROCYTE: 7 MM/HR (ref 0–20)
WBC: 5.3 K/UL (ref 3.5–11)

## 2022-08-17 PROCEDURE — 99214 OFFICE O/P EST MOD 30 MIN: CPT | Performed by: INTERNAL MEDICINE

## 2022-08-17 RX ORDER — HYDROXYCHLOROQUINE SULFATE 200 MG/1
300 TABLET, FILM COATED ORAL DAILY
Qty: 135 TABLET | Refills: 1 | Status: SHIPPED | OUTPATIENT
Start: 2022-08-17

## 2022-08-17 ASSESSMENT — ENCOUNTER SYMPTOMS
SHORTNESS OF BREATH: 0
NAUSEA: 0
VOMITING: 0
CONSTIPATION: 0
EYE PAIN: 0
EYE REDNESS: 0
DIARRHEA: 0
EYES NEGATIVE: 1
COUGH: 0
WHEEZING: 0

## 2022-08-17 NOTE — PROGRESS NOTES
[facility] RHEUMATOLOGY FOLLOW UP NOTE       Date Of Service: 8/17/2022  Provider: Jerry Saleh DO ,   PCP: Ravi Resendez MD   Name: Wicho Canales   MRN: [id number]        History of Present Illness (HPI)     Chief Complaint   Patient presents with    Follow-up     6 month f/u Undifferentiated connective tissue disease        Wicho Canales  is a(n)42 y.o. female with a hx of PsA vs undifferentiated connective tissue disease, asthma, gastric ulcer previosuly for the f/u evaluation of undiff. inflammatory arthritis, medication monitoring      --- increased fatigue and tiredness for the past 2-3 months - has had increased familiy activities , kids events. -- left foot pain - intermittently occurring in the MTPs. Relief with MTP pads, and broke in shoes. Aggravated with prolonged stadning, walking. Currently with arthralgia of the right hand, bilat shoulder, left toes, and lower back. Pain 4/10, intermittent, greatest in the evenings. + AM stiffnes lasting about 10-15 minutes. fmhx - father - psoriasis, pat uncle and pat cousing w/ psoriatic arthritis. REVIEW OF SYSTEMS: (ROS)    Review of Systems   Constitutional:  Positive for fatigue. Negative for diaphoresis and fever. HENT:  Positive for mouth sores (intermittent w/ increased stress). Negative for congestion, hearing loss and nosebleeds. Eyes: Negative. Negative for pain and redness. Respiratory:  Negative for cough, shortness of breath and wheezing. Cardiovascular: Negative. Negative for chest pain. Gastrointestinal:  Negative for constipation, diarrhea, nausea and vomiting. Genitourinary:  Negative for difficulty urinating, frequency and hematuria. Musculoskeletal:  Negative for myalgias. Skin:  Negative for rash. Neurological:  Negative for dizziness, weakness and headaches. Hematological:  Does not bruise/bleed easily. Psychiatric/Behavioral:  Negative for sleep disturbance.  The patient is not nervous/anxious. PAST MEDICAL HISTORY     has a past medical history of Asthma and Psoriatic arthritis ([facility] [address] PAST SURGICAL HISTORY     has a past surgical history that includes [place] tooth extraction; Tonsillectomy; Knee arthroscopy; and Dilation and curettage of uterus (N/A, 10/21/2021). Kamlesh Rajan FAMILY HISTORY    Reviewed and updated in chart. SOCIAL HISTORY     reports that she has never smoked. She has never used smokeless tobacco. She reports current alcohol use. She reports that she does not use drugs. ALLERGIES     Allergies   Allergen Reactions    Sulfa Antibiotics Rash       CURRENT MEDICATIONS      Current Outpatient Medications:     naproxen (NAPROSYN) 375 MG tablet, Take 1 tablet by mouth 2 times daily as needed for Pain, Disp: 180 tablet, Rfl: 1    hydroxychloroquine (PLAQUENIL) 200 MG tablet, Take 1 tablet by mouth once daily, Disp: 90 tablet, Rfl: 3    Multiple Vitamins-Minerals (MULTIVITAMIN ADULT PO), Take by mouth, Disp: , Rfl:     Acetaminophen (TYLENOL ARTHRITIS PAIN PO), Take by mouth, Disp: , Rfl:     Cholecalciferol (VITAMIN D) 2000 UNITS CAPS capsule, Take 1 capsule by mouth., Disp: , Rfl:     PHYSICAL EXAMINATION / OBJECTIVE     Objective:  /78 (Site: Right Upper Arm, Position: Sitting, Cuff Size: Medium Adult)   Pulse 78   Ht 5' 2.01\" (1.575 m)   Wt 141 lb (64 kg)   SpO2 99%   BMI 25.78 kg/m²     General Appearance: General appearance:  AAO x 3 ,  well-developed and well nourished  Head: NCAT  Eyes: No abnormalities. ,  Sclera non-icteric,   Ears / Nose:  normal  appearance  ears and nose. No active drainage from nose. Mouth:  MMM, ears w/o deformities  Neck: No jugular venous distention, appears symmetric, good ROM  Lymph: no cervical adenopathy   Pulmonary/Chest: CTA bilateral ,  symmetric chest expansion. Cardiovascular: Normal S1 and S2, NO murmur, rub, gallop  Neurologic: Speech normal, no facial droop,  Skin: NO rash on exposed skin. Methotrexate, Sulfasalazine , arava    -- naproxen 375mg twice daily. bilat foot pain - tender MTPs left and mid sole left    - ? Plantar fasciitis and inflammatory arthritis. -- discussed shoe inserts and stretching.    - medication adjustment as above. Chronic low back pain -- mild and intermittent   - intermittent, localized, + shober testing. MRI w/o abnormalities. - prior xray more consistent with osteitis condensans      Medication monitoring   - labs feb 2022.     - plaquenil eye exam (Dr. Shannan Alex in [place])- 3-3-2022

## 2022-08-18 LAB
ALBUMIN SERPL-MCNC: 4.9 G/DL (ref 3.5–5.2)
ALK PHOSPHATASE: 52 U/L (ref 40–112)
ALT SERPL-CCNC: 15 U/L (ref 5–40)
ANION GAP SERPL CALCULATED.3IONS-SCNC: 9 MEQ/L (ref 7–16)
APPEARANCE: CLEAR
AST SERPL-CCNC: 18 U/L (ref 9–40)
BACTERIA: ABNORMAL PER HPF
BILIRUB SERPL-MCNC: 0.4 MG/DL
BILIRUBIN: NEGATIVE
BUN BLDV-MCNC: 16 MG/DL (ref 6–20)
C-REACTIVE PROTEIN: <3 G/DL
CALCIUM SERPL-MCNC: 9.4 MG/DL (ref 8.5–10.5)
CHLORIDE BLD-SCNC: 103 MEQ/L (ref 95–107)
CO2: 26 MEQ/L (ref 19–31)
COLOR: YELLOW
CREAT SERPL-MCNC: 0.75 MG/DL (ref 0.6–1.3)
CRYSTALS: PRESENT
EGFR IF NONAFRICAN AMERICAN: 103 ML/MIN/1.73
EPITHELIAL CELLS: ABNORMAL PER HPF (ref 0–10)
GLUCOSE BLD-MCNC: NEGATIVE MG/DL
GLUCOSE: 84 MG/DL (ref 70–99)
HYALINE CASTS: ABNORMAL
KETONES, URINE: NEGATIVE
LEUKOCYTE ESTERASE, URINE: NEGATIVE
NITRITE, URINE: NEGATIVE
OCCULT BLOOD,URINE: NEGATIVE
PH: 7 (ref 5–9)
POTASSIUM SERPL-SCNC: 4.1 MEQ/L (ref 3.5–5.4)
PROTEIN, URINE: NEGATIVE
RBC: ABNORMAL PER HPF (ref 0–5)
SODIUM BLD-SCNC: 138 MEQ/L (ref 133–146)
SP GRAVITY MISCELLANEOUS: 1.02 (ref 1–1.03)
TOTAL PROTEIN: 6.8 G/DL (ref 6.1–8.3)
UROBILINOGEN, URINE: 0.2
WBC: ABNORMAL PER HPF (ref 0–5)

## 2022-08-19 LAB
COMPLEMENT C3: 128 MG/DL (ref 90–180)
COMPLEMENT C4: 21 MG/DL (ref 10–40)

## 2023-01-06 ENCOUNTER — OFFICE VISIT (OUTPATIENT)
Dept: RHEUMATOLOGY | Age: 43
End: 2023-01-06
Payer: COMMERCIAL

## 2023-01-06 VITALS
BODY MASS INDEX: 26.68 KG/M2 | OXYGEN SATURATION: 100 % | SYSTOLIC BLOOD PRESSURE: 116 MMHG | HEIGHT: 62 IN | DIASTOLIC BLOOD PRESSURE: 62 MMHG | HEART RATE: 88 BPM | WEIGHT: 145 LBS

## 2023-01-06 DIAGNOSIS — M35.9 UNDIFFERENTIATED CONNECTIVE TISSUE DISEASE (HCC): Primary | ICD-10-CM

## 2023-01-06 PROCEDURE — 99214 OFFICE O/P EST MOD 30 MIN: CPT | Performed by: INTERNAL MEDICINE

## 2023-01-06 ASSESSMENT — ENCOUNTER SYMPTOMS
EYES NEGATIVE: 1
GASTROINTESTINAL NEGATIVE: 1
RESPIRATORY NEGATIVE: 1

## 2023-01-06 ASSESSMENT — JOINT PAIN
TOTAL NUMBER OF TENDER JOINTS: 1
TOTAL NUMBER OF SWOLLEN JOINTS: 0

## 2023-01-06 NOTE — PROGRESS NOTES
[facility] RHEUMATOLOGY FOLLOW UP NOTE       Date Of Service: 1/6/2023  Provider: Zackary Chand DO ,   PCP: America Figueroa MD   Name: Edda Castillo   MRN: [id number]        History of Present Illness (HPI)     No chief complaint on file. Edda Castillo  is a(n)42 y.o. female with a hx of PsA vs undifferentiated connective tissue disease, asthma, gastric ulcer previosuly for the f/u evaluation of undiff. inflammatory arthritis, medication monitoring    - burning left 2nd finger - intermittent -- ? Worse with use , ccasionally waking patient. Pain mild up to 3-4/10. mild intermittent foot pain worse with prolonged standing and walking. Relief with naproxen up to 4 times per week. - decreased fatigue with the higher dosing of Plaquenil     fmhx - father - psoriasis, pat uncle and pat cousing w/ psoriatic arthritis. REVIEW OF SYSTEMS: (ROS)    Review of Systems   Constitutional: Negative. HENT: Negative. Eyes: Negative. Respiratory: Negative. Cardiovascular: Negative. Gastrointestinal: Negative. Endocrine: Negative. Genitourinary: Negative. Skin: Negative. Neurological: Negative. Hematological: Negative. PAST MEDICAL HISTORY     has a past medical history of Asthma and Psoriatic arthritis ([address] PAST SURGICAL HISTORY     has a past surgical history that includes [place] tooth extraction; Tonsillectomy; Knee arthroscopy; and Dilation and curettage of uterus (N/A, 10/21/2021). Malena Peterson FAMILY HISTORY    Reviewed and updated in chart. SOCIAL HISTORY     reports that she has never smoked. She has never used smokeless tobacco. She reports current alcohol use. She reports that she does not use drugs.     ALLERGIES     Allergies   Allergen Reactions    Sulfa Antibiotics Rash       CURRENT MEDICATIONS      Current Outpatient Medications:     hydroxychloroquine (PLAQUENIL) 200 MG tablet, Take 1.5 tablets by mouth daily, Disp: 135 tablet, Rfl: 1    naproxen (NAPROSYN) 375 MG tablet, Take 1 tablet by mouth 2 times daily as needed for Pain, Disp: 180 tablet, Rfl: 1    Multiple Vitamins-Minerals (MULTIVITAMIN ADULT PO), Take by mouth, Disp: , Rfl:     Acetaminophen (TYLENOL ARTHRITIS PAIN PO), Take by mouth, Disp: , Rfl:     Cholecalciferol (VITAMIN D) 2000 UNITS CAPS capsule, Take 1 capsule by mouth., Disp: , Rfl:     PHYSICAL EXAMINATION / OBJECTIVE     Objective:  /62 (Site: Right Upper Arm, Position: Sitting, Cuff Size: Medium Adult)   Pulse 88   Ht 5' 2.01\" (1.575 m)   Wt 145 lb (65.8 kg)   SpO2 100%   BMI 26.51 kg/m²     General Appearance: General appearance:  AAO x 3 ,  well-developed and well nourished  Head: NCAT  Eyes: No abnormalities. ,  Sclera non-icteric,   Ears / Nose:  normal  appearance  ears and nose. No active drainage from nose. Mouth:  MMM, ears w/o deformities  Neck: No jugular venous distention, appears symmetric, good ROM  Lymph: no cervical adenopathy   Pulmonary/Chest: CTA bilateral ,  symmetric chest expansion. Cardiovascular: Normal S1 and S2, NO murmur, rub, gallop  Neurologic: Speech normal, no facial droop,  Skin: NO rash on exposed skin. Musculoskeletal:  Upper extremities:  SHOULDERS ,  ELBOWS     HANDS/FINGERS - tender left # 2 PIP  Wrists: + tinel and phalen     Lower extremities:  HIPS  KNEES , ANKLES  - Non-tender. FEET : MTP Right > left. + fullness 2-4 MTPs. Physical Exam    There is currently no information documented on the [facility].  Go to the Rheumatology activity and complete the [facility] joint exam.    PASTRANA-28 (ESR): --  PASTRANA-28 (CRP): --            LABS      Lab Results   Component Value Date    WBC 5.3 08/17/2022    WBC 0-5 08/17/2022    HGB 15.1 08/17/2022    MCV 96.7 08/17/2022    MCHC 34.6 08/17/2022    RDW 11.6 08/17/2022     08/17/2022    NEUTROABS 3.02 08/17/2022    LYMPHSABS 1.70 08/17/2022    EOSABS 0.12 08/17/2022    BASOSABS 0.06 08/17/2022         Chemistry        Component Value Date/Time     08/17/2022 0925    K 4.1 08/17/2022 0925     08/17/2022 0925    CO2 26 08/17/2022 0925    BUN 16 08/17/2022 0925    CREATININE 0.75 08/17/2022 0925        Component Value Date/Time    CALCIUM 9.4 08/17/2022 0925    ALKPHOS 52 08/17/2022 0925    ALKPHOS 39 02/24/2022 0000    AST 18 08/17/2022 0925    ALT 15 08/17/2022 0925    BILITOT 0.4 08/17/2022 0925    BILITOT NEGATIVE 08/17/2022 0925          Lab Results   Component Value Date    SEDRATE 7 08/17/2022    SEDRATE 3 02/24/2022    SEDRATE 2 12/03/2020    CRP <3 08/17/2022    CRP 0.08 02/24/2022    CRP 0.04 12/03/2020       RADIOLOGY / PROCEDURES:     ASSESSMENT/PLAN:     Undifferentiated inflammatory arthritis -  -- +TIMUR, oral sores, inflammatory arthritis,  neg ASHLEIGH , strong fmhx of psoriasis & PsA   -- plaquenil to 300 mg daily    -- declined additional medications. --- Methotrexate, Sulfasalazine , arava    -- naproxen 375mg twice daily. bilat foot pain - tender MTPs left and mid sole left    Concern for active inflammatory arthritis. And possible component [place]ter fasciitis. Paresthesias left index finger: Suspected carpal tunnel based upon the positive Tinel and Phalen testing. Patient wanting to try wrist splinting as initial treatment. If symptoms persist we could pursue an EMG to evaluate for compressive neuropathy. Chronic low back pain -- mild and intermittent   - intermittent, localized, + shober testing. MRI w/o abnormalities. - prior xray more consistent with osteitis condensans      Medication monitoring   - labs August 17, 2022 and stable. Reviewed labs with patient.    - plaquenil eye exam (Dr. Anuel Avila in [place])- 3-3-2022

## 2023-01-17 ENCOUNTER — NURSE ONLY (OUTPATIENT)
Dept: LAB | Age: 43
End: 2023-01-17

## 2023-01-24 LAB — CYTOLOGY THIN PREP PAP: NORMAL

## 2023-03-29 DIAGNOSIS — M35.9 UNDIFFERENTIATED CONNECTIVE TISSUE DISEASE (HCC): ICD-10-CM

## 2023-03-30 RX ORDER — HYDROXYCHLOROQUINE SULFATE 200 MG/1
TABLET, FILM COATED ORAL
Qty: 135 TABLET | Refills: 3 | Status: SHIPPED | OUTPATIENT
Start: 2023-03-30

## 2023-07-06 ENCOUNTER — OFFICE VISIT (OUTPATIENT)
Dept: RHEUMATOLOGY | Age: 43
End: 2023-07-06
Payer: COMMERCIAL

## 2023-07-06 VITALS
DIASTOLIC BLOOD PRESSURE: 60 MMHG | OXYGEN SATURATION: 99 % | WEIGHT: 146.8 LBS | SYSTOLIC BLOOD PRESSURE: 104 MMHG | HEIGHT: 62 IN | HEART RATE: 79 BPM | BODY MASS INDEX: 27.02 KG/M2

## 2023-07-06 DIAGNOSIS — Z51.81 MEDICATION MONITORING ENCOUNTER: ICD-10-CM

## 2023-07-06 DIAGNOSIS — M35.9 UNDIFFERENTIATED CONNECTIVE TISSUE DISEASE (HCC): Primary | ICD-10-CM

## 2023-07-06 DIAGNOSIS — M72.2 PLANTAR FASCIITIS, BILATERAL: ICD-10-CM

## 2023-07-06 PROCEDURE — 99214 OFFICE O/P EST MOD 30 MIN: CPT | Performed by: INTERNAL MEDICINE

## 2023-07-06 ASSESSMENT — ENCOUNTER SYMPTOMS
EYES NEGATIVE: 1
RESPIRATORY NEGATIVE: 1
GASTROINTESTINAL NEGATIVE: 1

## 2023-07-06 NOTE — PROGRESS NOTES
BASOSABS 0.06 08/17/2022         Chemistry        Component Value Date/Time     08/17/2022 0925    K 4.1 08/17/2022 0925     08/17/2022 0925    CO2 26 08/17/2022 0925    BUN 16 08/17/2022 0925    CREATININE 0.75 08/17/2022 0925        Component Value Date/Time    CALCIUM 9.4 08/17/2022 0925    ALKPHOS 52 08/17/2022 0925    ALKPHOS 39 02/24/2022 0000    AST 18 08/17/2022 0925    ALT 15 08/17/2022 0925    BILITOT 0.4 08/17/2022 0925    BILITOT NEGATIVE 08/17/2022 0925          Lab Results   Component Value Date    SEDRATE 7 08/17/2022    SEDRATE 3 02/24/2022    SEDRATE 2 12/03/2020    CRP <3 08/17/2022    CRP 0.08 02/24/2022    CRP 0.04 12/03/2020       RADIOLOGY / PROCEDURES:     ASSESSMENT/PLAN:     Undifferentiated inflammatory arthritis -  mild dz. -- +TIMUR, oral sores, inflammatory arthritis,  neg ASHLEIGH , strong fmhx of psoriasis & PsA   -- plaquenil to 300 mg daily    -- naproxen 375mg twice daily. Plantar fasciitis -    bilat foot pain - tender MTPs left and mid sole left    - asked patient to try shoe inserts. Paresthesias left index finger: intermittent, worse with increased use. Chronic low back pain -- mild and intermittent   - intermittent, localized, + shober testing. MRI w/o abnormalities. - prior xray more consistent with osteitis condensans      Medication monitoring   - labs August 17, 2022 and stable. Reviewed labs with patient. - plaquenil eye exam (Dr. Black Sotomayor in [facility])- feb 2023.

## 2023-09-03 DIAGNOSIS — M35.9 UNDIFFERENTIATED CONNECTIVE TISSUE DISEASE (HCC): ICD-10-CM

## 2023-09-03 DIAGNOSIS — M72.2 PLANTAR FASCIITIS, BILATERAL: ICD-10-CM

## 2023-09-05 RX ORDER — NAPROXEN 375 MG/1
TABLET ORAL
Qty: 120 TABLET | Refills: 0 | Status: SHIPPED | OUTPATIENT
Start: 2023-09-05

## 2024-03-10 DIAGNOSIS — M35.9 UNDIFFERENTIATED CONNECTIVE TISSUE DISEASE (HCC): ICD-10-CM

## 2024-03-10 DIAGNOSIS — M72.2 PLANTAR FASCIITIS, BILATERAL: ICD-10-CM

## 2024-03-11 RX ORDER — NAPROXEN 375 MG/1
TABLET ORAL
Qty: 120 TABLET | Refills: 0 | Status: SHIPPED | OUTPATIENT
Start: 2024-03-11

## 2024-03-11 RX ORDER — HYDROXYCHLOROQUINE SULFATE 200 MG/1
TABLET, FILM COATED ORAL
Qty: 135 TABLET | Refills: 0 | Status: SHIPPED | OUTPATIENT
Start: 2024-03-11

## 2024-06-04 DIAGNOSIS — M35.9 UNDIFFERENTIATED CONNECTIVE TISSUE DISEASE (HCC): ICD-10-CM

## 2024-06-04 RX ORDER — HYDROXYCHLOROQUINE SULFATE 200 MG/1
TABLET, FILM COATED ORAL
Qty: 135 TABLET | Refills: 0 | Status: SHIPPED | OUTPATIENT
Start: 2024-06-04

## 2024-07-11 ENCOUNTER — OFFICE VISIT (OUTPATIENT)
Dept: RHEUMATOLOGY | Age: 44
End: 2024-07-11
Payer: COMMERCIAL

## 2024-07-11 VITALS
DIASTOLIC BLOOD PRESSURE: 62 MMHG | SYSTOLIC BLOOD PRESSURE: 122 MMHG | OXYGEN SATURATION: 98 % | BODY MASS INDEX: 27.57 KG/M2 | WEIGHT: 149.8 LBS | HEART RATE: 94 BPM | HEIGHT: 62 IN

## 2024-07-11 DIAGNOSIS — M35.9 UNDIFFERENTIATED CONNECTIVE TISSUE DISEASE (HCC): Primary | ICD-10-CM

## 2024-07-11 DIAGNOSIS — M72.2 PLANTAR FASCIITIS, BILATERAL: ICD-10-CM

## 2024-07-11 DIAGNOSIS — Z51.81 MEDICATION MONITORING ENCOUNTER: ICD-10-CM

## 2024-07-11 PROCEDURE — 99214 OFFICE O/P EST MOD 30 MIN: CPT | Performed by: INTERNAL MEDICINE

## 2024-07-11 ASSESSMENT — ENCOUNTER SYMPTOMS
EYES NEGATIVE: 1
GASTROINTESTINAL NEGATIVE: 1
RESPIRATORY NEGATIVE: 1

## 2024-07-11 NOTE — PROGRESS NOTES
[facility] RHEUMATOLOGY FOLLOW UP NOTE       Date Of Service: 7/11/2024  Provider: OLI MARROQUIN DO ,   PCP: River Kim MD   Name: Von Winters   MRN: [id number]        History of Present Illness (HPI)     Chief Complaint   Patient presents with    Follow-up     1-year follow-up for connective tissue disease          Von Winters  is a(n)44 y.o. female with a hx of PsA vs undifferentiated connective tissue disease, asthma, gastric ulcer previosuly for the f/u evaluation of undiff. inflammatory arthritis, medication monitoring    Inflammatory arthritis well-controlled at this time.  She does report taking naproxen nightly as this helps decrease waking from shoulder pain.  No flares or other complaints.  Denies rashes, sores (oral or nasal), shortness of breath.       fmhx - father - psoriasis, pat uncle and pat cousing w/ psoriatic arthritis.     REVIEW OF SYSTEMS: (ROS)    Review of Systems   Constitutional: Negative.    HENT: Negative.     Eyes: Negative.    Respiratory: Negative.     Cardiovascular: Negative.    Gastrointestinal: Negative.    Endocrine: Negative.    Genitourinary: Negative.    Skin: Negative.    Neurological: Negative.    Hematological: Negative.          PAST MEDICAL HISTORY     has a past medical history of Asthma and Psoriatic arthritis (HCC).     PAST SURGICAL HISTORY     has a past surgical history that includes [place] tooth extraction; Tonsillectomy; Knee arthroscopy; and Dilation and curettage of uterus (N/A, 10/21/2021)..    FAMILY HISTORY    Reviewed and updated in chart.     SOCIAL HISTORY     reports that she has never smoked. She has never used smokeless tobacco. She reports current alcohol use. She reports that she does not use drugs.    ALLERGIES     Allergies   Allergen Reactions    Sulfa Antibiotics Rash       CURRENT MEDICATIONS      Current Outpatient Medications:     hydroxychloroquine (PLAQUENIL) 200 MG tablet, TAKE 1 & 1/2 (ONE & ONE-HALF) TABLETS BY

## 2024-08-19 DIAGNOSIS — M35.9 UNDIFFERENTIATED CONNECTIVE TISSUE DISEASE (HCC): ICD-10-CM

## 2024-08-19 DIAGNOSIS — M72.2 PLANTAR FASCIITIS, BILATERAL: ICD-10-CM

## 2024-08-20 RX ORDER — NAPROXEN 375 MG/1
TABLET ORAL
Qty: 120 TABLET | Refills: 0 | Status: SHIPPED | OUTPATIENT
Start: 2024-08-20

## 2024-09-03 DIAGNOSIS — M35.9 UNDIFFERENTIATED CONNECTIVE TISSUE DISEASE (HCC): ICD-10-CM

## 2024-09-03 RX ORDER — HYDROXYCHLOROQUINE SULFATE 200 MG/1
TABLET, FILM COATED ORAL
Qty: 135 TABLET | Refills: 0 | Status: SHIPPED | OUTPATIENT
Start: 2024-09-03

## 2024-11-24 DIAGNOSIS — M35.9 UNDIFFERENTIATED CONNECTIVE TISSUE DISEASE (HCC): ICD-10-CM

## 2024-11-25 RX ORDER — HYDROXYCHLOROQUINE SULFATE 200 MG/1
TABLET, FILM COATED ORAL
Qty: 135 TABLET | Refills: 0 | Status: SHIPPED | OUTPATIENT
Start: 2024-11-25

## 2024-12-17 DIAGNOSIS — M72.2 PLANTAR FASCIITIS, BILATERAL: ICD-10-CM

## 2024-12-17 DIAGNOSIS — M35.9 UNDIFFERENTIATED CONNECTIVE TISSUE DISEASE (HCC): ICD-10-CM

## 2025-02-26 DIAGNOSIS — M35.9 UNDIFFERENTIATED CONNECTIVE TISSUE DISEASE: ICD-10-CM

## 2025-02-26 RX ORDER — HYDROXYCHLOROQUINE SULFATE 200 MG/1
TABLET, FILM COATED ORAL
Qty: 135 TABLET | Refills: 0 | Status: SHIPPED | OUTPATIENT
Start: 2025-02-26

## 2025-03-26 ENCOUNTER — TRANSCRIBE ORDERS (OUTPATIENT)
Dept: ADMINISTRATIVE | Age: 45
End: 2025-03-26

## 2025-03-26 DIAGNOSIS — R10.11 RUQ PAIN: Primary | ICD-10-CM

## 2025-05-12 DIAGNOSIS — M72.2 PLANTAR FASCIITIS, BILATERAL: ICD-10-CM

## 2025-05-12 DIAGNOSIS — M35.9 UNDIFFERENTIATED CONNECTIVE TISSUE DISEASE: ICD-10-CM

## 2025-05-13 RX ORDER — NAPROXEN 375 MG/1
375 TABLET ORAL 2 TIMES DAILY PRN
Qty: 120 TABLET | Refills: 0 | OUTPATIENT
Start: 2025-05-13

## 2025-05-21 DIAGNOSIS — M35.9 UNDIFFERENTIATED CONNECTIVE TISSUE DISEASE: ICD-10-CM

## 2025-05-21 DIAGNOSIS — M72.2 PLANTAR FASCIITIS, BILATERAL: ICD-10-CM

## 2025-05-21 RX ORDER — NAPROXEN 375 MG/1
375 TABLET ORAL 2 TIMES DAILY PRN
Qty: 120 TABLET | Refills: 0 | Status: SHIPPED | OUTPATIENT
Start: 2025-05-21

## 2025-05-21 RX ORDER — HYDROXYCHLOROQUINE SULFATE 200 MG/1
TABLET, FILM COATED ORAL
Qty: 135 TABLET | Refills: 0 | Status: SHIPPED | OUTPATIENT
Start: 2025-05-21

## 2025-07-24 ENCOUNTER — OFFICE VISIT (OUTPATIENT)
Age: 45
End: 2025-07-24
Payer: COMMERCIAL

## 2025-07-24 VITALS
SYSTOLIC BLOOD PRESSURE: 124 MMHG | HEIGHT: 62 IN | WEIGHT: 151.2 LBS | HEART RATE: 94 BPM | BODY MASS INDEX: 27.82 KG/M2 | OXYGEN SATURATION: 99 % | DIASTOLIC BLOOD PRESSURE: 80 MMHG

## 2025-07-24 DIAGNOSIS — M35.9 UNDIFFERENTIATED CONNECTIVE TISSUE DISEASE: ICD-10-CM

## 2025-07-24 DIAGNOSIS — M72.2 PLANTAR FASCIITIS, BILATERAL: ICD-10-CM

## 2025-07-24 DIAGNOSIS — Z51.81 MEDICATION MONITORING ENCOUNTER: Primary | ICD-10-CM

## 2025-07-24 PROCEDURE — 99214 OFFICE O/P EST MOD 30 MIN: CPT | Performed by: NURSE PRACTITIONER

## 2025-07-24 RX ORDER — NAPROXEN 375 MG/1
375 TABLET ORAL 2 TIMES DAILY PRN
Qty: 120 TABLET | Refills: 0 | Status: SHIPPED | OUTPATIENT
Start: 2025-07-24

## 2025-07-24 RX ORDER — HYDROXYCHLOROQUINE SULFATE 200 MG/1
300 TABLET, FILM COATED ORAL DAILY
Qty: 135 TABLET | Refills: 1 | Status: SHIPPED | OUTPATIENT
Start: 2025-07-24

## 2025-07-24 ASSESSMENT — ENCOUNTER SYMPTOMS
EYES NEGATIVE: 1
GASTROINTESTINAL NEGATIVE: 1
RESPIRATORY NEGATIVE: 1

## 2025-07-24 NOTE — PROGRESS NOTES
[facility] RHEUMATOLOGY FOLLOW UP NOTE     Date Of Service: 7/24/2025  Provider: Carlita Wallace, APRN - CNP   PCP: River Kim MD   Name: Von Winters   MRN: [id number]    Subjective   CHIEF COMPLAINT:    Chief Complaint   Patient presents with    Follow-up     1 year follow up UCTD        Von Winters  is a(n)45 y.o. female  here for the f/u evaluation of inflammatory arthritis     Interval hx:    - denies any flares since last visit    Still taking the plaquenil. Denies any joint pains, joint swelling or morning stiffness.     Denies any oral sores, rash.     REVIEW OF SYSTEMS: (ROS)    Review of Systems   Constitutional: Negative.    HENT: Negative.     Eyes: Negative.    Respiratory: Negative.     Cardiovascular: Negative.    Gastrointestinal: Negative.    Endocrine: Negative.    Genitourinary: Negative.    Musculoskeletal: Negative.    Skin: Negative.    Neurological: Negative.    Psychiatric/Behavioral: Negative.         Past Medical History:  has a past medical history of Asthma and Psoriatic arthritis (HCC).    Current Medications:    Current Outpatient Medications   Medication Instructions    Acetaminophen (TYLENOL ARTHRITIS PAIN PO) Take by mouth    Cholecalciferol (VITAMIN D) 2000 UNITS CAPS capsule 1 capsule    hydroxychloroquine (PLAQUENIL) 200 MG tablet TAKE 1 & 1/2 (ONE & ONE-HALF) TABLETS BY MOUTH ONCE DAILY    Multiple Vitamins-Minerals (MULTIVITAMIN ADULT PO) Take by mouth    naproxen (NAPROSYN) 375 mg, Oral, 2 TIMES DAILY PRN       Allergies:  Sulfa antibiotics    Objective   There were no vitals taken for this visit.    Physical Exam  Vitals reviewed.   Constitutional:       Appearance: She is well-developed.   Cardiovascular:      Rate and Rhythm: Normal rate and regular rhythm.   Pulmonary:      Effort: Pulmonary effort is normal.      Breath sounds: Normal breath sounds.   Musculoskeletal:      Cervical back: Normal range of motion and neck supple.   Skin:     General:

## (undated) DEVICE — SOLUTION SURG PREP POV IOD 7.5% 4 OZ

## (undated) DEVICE — PAD,SANITARY,11 IN,MAXI,W/WINGS,N-STRL: Brand: MEDLINE

## (undated) DEVICE — GLOVE SURG SZ 6 THK91MIL LTX FREE SYN POLYISOPRENE ANTI

## (undated) DEVICE — CYSTO/BLADDER IRRIGATION SET, REGULATING CLAMP

## (undated) DEVICE — TUBING, SUCTION, 1/4" X 12', STRAIGHT: Brand: MEDLINE

## (undated) DEVICE — GAUZE,SPONGE,8"X4",12PLY,XRAY,STRL,LF: Brand: MEDLINE

## (undated) DEVICE — DRAPE,UNDERBUTTOCKS,PCH,STERILE: Brand: MEDLINE

## (undated) DEVICE — Z DISCONTINUED BY MEDLINE USE 2711682 TRAY SKIN PREP DRY W/ PREM GLV

## (undated) DEVICE — PAD,NON-ADHERENT,3X8,STERILE,LF,1/PK: Brand: MEDLINE

## (undated) DEVICE — RED RUBBER ROBINSON URETHRAL CATHETER, RADIOPAQUE, SMOOTH ROUNDED TIP, 14 FR (4.7 MM): Brand: DOVER

## (undated) DEVICE — SURE SET SINGLE BASIN-LF: Brand: MEDLINE INDUSTRIES, INC.

## (undated) DEVICE — SOLUTION IV 1000ML 0.9% SOD CHL PH 5 INJ USP VIAFLX PLAS

## (undated) DEVICE — PACK,SET UP,NO DRAPES: Brand: MEDLINE

## (undated) DEVICE — PREP SOL PVP IODINE 4%  4 OZ/BTL